# Patient Record
Sex: MALE | Race: WHITE | NOT HISPANIC OR LATINO | Employment: FULL TIME | URBAN - METROPOLITAN AREA
[De-identification: names, ages, dates, MRNs, and addresses within clinical notes are randomized per-mention and may not be internally consistent; named-entity substitution may affect disease eponyms.]

---

## 2017-06-08 ENCOUNTER — HOSPITAL ENCOUNTER (EMERGENCY)
Facility: HOSPITAL | Age: 62
Discharge: HOME | End: 2017-06-08
Attending: FAMILY MEDICINE
Payer: COMMERCIAL

## 2017-06-08 VITALS
HEIGHT: 70 IN | HEART RATE: 77 BPM | WEIGHT: 235 LBS | TEMPERATURE: 98 F | OXYGEN SATURATION: 99 % | BODY MASS INDEX: 33.64 KG/M2 | RESPIRATION RATE: 15 BRPM | SYSTOLIC BLOOD PRESSURE: 145 MMHG | DIASTOLIC BLOOD PRESSURE: 95 MMHG

## 2017-06-08 DIAGNOSIS — N28.89 RENAL MASS, LEFT: Primary | ICD-10-CM

## 2017-06-08 DIAGNOSIS — R31.9 HEMATURIA: ICD-10-CM

## 2017-06-08 LAB
ALBUMIN SERPL BCP-MCNC: 4 G/DL
ALP SERPL-CCNC: 59 U/L
ALT SERPL W/O P-5'-P-CCNC: 21 U/L
ANION GAP SERPL CALC-SCNC: 10 MMOL/L
AST SERPL-CCNC: 23 U/L
BACTERIA #/AREA URNS AUTO: ABNORMAL /HPF
BASOPHILS # BLD AUTO: 0.04 K/UL
BASOPHILS NFR BLD: 0.4 %
BILIRUB SERPL-MCNC: 0.8 MG/DL
BILIRUB UR QL STRIP: NEGATIVE
BUN SERPL-MCNC: 15 MG/DL
CALCIUM SERPL-MCNC: 9.9 MG/DL
CHLORIDE SERPL-SCNC: 101 MMOL/L
CLARITY UR REFRACT.AUTO: ABNORMAL
CO2 SERPL-SCNC: 27 MMOL/L
COLOR UR AUTO: YELLOW
CREAT SERPL-MCNC: 1.2 MG/DL
DIFFERENTIAL METHOD: NORMAL
EOSINOPHIL # BLD AUTO: 0.2 K/UL
EOSINOPHIL NFR BLD: 1.9 %
ERYTHROCYTE [DISTWIDTH] IN BLOOD BY AUTOMATED COUNT: 12.7 %
EST. GFR  (AFRICAN AMERICAN): >60 ML/MIN/1.73 M^2
EST. GFR  (NON AFRICAN AMERICAN): >60 ML/MIN/1.73 M^2
GLUCOSE SERPL-MCNC: 122 MG/DL
GLUCOSE UR QL STRIP: NEGATIVE
HCT VFR BLD AUTO: 45.2 %
HGB BLD-MCNC: 15.4 G/DL
HGB UR QL STRIP: ABNORMAL
HYALINE CASTS UR QL AUTO: 6 /LPF
KETONES UR QL STRIP: NEGATIVE
LEUKOCYTE ESTERASE UR QL STRIP: NEGATIVE
LYMPHOCYTES # BLD AUTO: 1.9 K/UL
LYMPHOCYTES NFR BLD: 18.9 %
MCH RBC QN AUTO: 29.7 PG
MCHC RBC AUTO-ENTMCNC: 34.1 %
MCV RBC AUTO: 87 FL
MICROSCOPIC COMMENT: ABNORMAL
MONOCYTES # BLD AUTO: 0.8 K/UL
MONOCYTES NFR BLD: 8 %
NEUTROPHILS # BLD AUTO: 7 K/UL
NEUTROPHILS NFR BLD: 70.5 %
NITRITE UR QL STRIP: NEGATIVE
PH UR STRIP: 5 [PH] (ref 5–8)
PLATELET # BLD AUTO: 338 K/UL
PMV BLD AUTO: 9.9 FL
POTASSIUM SERPL-SCNC: 3.3 MMOL/L
PROT SERPL-MCNC: 8.1 G/DL
PROT UR QL STRIP: ABNORMAL
RBC # BLD AUTO: 5.19 M/UL
RBC #/AREA URNS AUTO: >100 /HPF (ref 0–4)
SODIUM SERPL-SCNC: 138 MMOL/L
SP GR UR STRIP: 1.01 (ref 1–1.03)
URN SPEC COLLECT METH UR: ABNORMAL
UROBILINOGEN UR STRIP-ACNC: NEGATIVE EU/DL
WBC # BLD AUTO: 9.96 K/UL
WBC #/AREA URNS AUTO: 7 /HPF (ref 0–5)

## 2017-06-08 PROCEDURE — 81001 URINALYSIS AUTO W/SCOPE: CPT

## 2017-06-08 PROCEDURE — 85025 COMPLETE CBC W/AUTO DIFF WBC: CPT

## 2017-06-08 PROCEDURE — 99284 EMERGENCY DEPT VISIT MOD MDM: CPT

## 2017-06-08 PROCEDURE — 80053 COMPREHEN METABOLIC PANEL: CPT

## 2017-06-08 PROCEDURE — 99285 EMERGENCY DEPT VISIT HI MDM: CPT | Mod: ,,, | Performed by: PHYSICIAN ASSISTANT

## 2017-06-08 RX ORDER — DIAZEPAM 2 MG/1
1 TABLET ORAL EVERY 6 HOURS PRN
COMMUNITY
End: 2017-06-28

## 2017-06-08 RX ORDER — ZOLPIDEM TARTRATE 12.5 MG/1
12.5 TABLET, FILM COATED, EXTENDED RELEASE ORAL NIGHTLY PRN
COMMUNITY
End: 2017-06-09 | Stop reason: HOSPADM

## 2017-06-08 RX ORDER — PRAVASTATIN SODIUM 10 MG/1
10 TABLET ORAL DAILY
COMMUNITY
End: 2017-06-28 | Stop reason: DRUGHIGH

## 2017-06-08 RX ORDER — ASPIRIN 81 MG/1
81 TABLET ORAL DAILY
COMMUNITY

## 2017-06-08 RX ORDER — HYDROCHLOROTHIAZIDE 25 MG/1
25 TABLET ORAL EVERY MORNING
COMMUNITY

## 2017-06-08 NOTE — ED TRIAGE NOTES
Pt c/o hematuria onset last night - reports bright red blood. Pt c/o left flank tenderness. Denies pain at this time. Denies abdominal pain. Denies pain or burning with urination. Denies fever or chills.

## 2017-06-08 NOTE — ED PROVIDER NOTES
Encounter Date: 6/8/2017       History     Chief Complaint   Patient presents with    Hematuria     Review of patient's allergies indicates:   Allergen Reactions    Penicillins Hives     Patient is a 62-year-old male with past medical history of hypertension who presents to the emergency department due to a 1 day history of hematuria.  Patient states that he was at his hotel at around 10 PM last night when he noticed his urine was a pinkish color. Patient states he awoke this morning and was having clear urine.  Patient states this afternoon he had one other episode of dark bloody urination.  Patient states that he is not having any pain at this time.  Patient denies any nausea, vomiting, fevers, chills, chest pain, shortness of breath, or any other complaints.        Past Medical History:   Diagnosis Date    High cholesterol     Hypertension      Past Surgical History:   Procedure Laterality Date    ROTATOR CUFF REPAIR Right      History reviewed. No pertinent family history.  Social History   Substance Use Topics    Smoking status: Former Smoker     Quit date: 6/8/1997    Smokeless tobacco: Not on file    Alcohol use Yes      Comment: occassional     Review of Systems   Constitutional: Negative for activity change, appetite change, diaphoresis, fatigue and fever.   HENT: Negative for congestion, dental problem, drooling, ear pain, facial swelling, sore throat and trouble swallowing.    Eyes: Negative for pain, discharge and visual disturbance.   Respiratory: Negative for apnea, cough, chest tightness and shortness of breath.    Cardiovascular: Negative for chest pain and palpitations.   Gastrointestinal: Negative for abdominal distention, anal bleeding, blood in stool, diarrhea, nausea and vomiting.   Endocrine: Negative for cold intolerance and polydipsia.   Genitourinary: Positive for hematuria. Negative for decreased urine volume, difficulty urinating, dysuria, enuresis and frequency.   Musculoskeletal:  Negative for arthralgias, gait problem, myalgias and neck stiffness.   Skin: Negative for color change and pallor.   Allergic/Immunologic: Negative for environmental allergies.   Neurological: Negative for dizziness, syncope, numbness and headaches.   Psychiatric/Behavioral: Negative for agitation, confusion and dysphoric mood.       Physical Exam     Initial Vitals [06/08/17 1331]   BP Pulse Resp Temp SpO2   (!) 145/95 77 15 98.1 °F (36.7 °C) 99 %     Physical Exam    Nursing note and vitals reviewed.  Constitutional: He appears well-developed and well-nourished. He is not diaphoretic. No distress.   HENT:   Head: Normocephalic and atraumatic.   Neck: Normal range of motion. Neck supple.   Cardiovascular: Normal rate, regular rhythm and normal heart sounds. Exam reveals no gallop and no friction rub.    No murmur heard.  Pulmonary/Chest: Breath sounds normal. He has no wheezes. He has no rhonchi. He has no rales.   Abdominal: Soft. Bowel sounds are normal. There is no tenderness. There is no rebound and no guarding.   Musculoskeletal: Normal range of motion.   Neurological: He is alert and oriented to person, place, and time.   Skin: Skin is warm and dry. No rash noted. No erythema.   Psychiatric: He has a normal mood and affect.         ED Course   Procedures  Labs Reviewed   CBC W/ AUTO DIFFERENTIAL   COMPREHENSIVE METABOLIC PANEL   URINALYSIS             Medical Decision Making:   History:   Old Medical Records: I decided to obtain old medical records.       APC / Resident Notes:   Patient is a 62-year-old male with past medical history of hypertension who presents to the emergency department due to a 1 day history of hematuria.  Physical exam reveals male in no acute distress.  Heart regular rate and rhythm.  Lungs clear to auscultation bilaterally.  Abdomen soft nontender nondistended.  No CVA tenderness.  Differential diagnosis includes but is not excluded to kidney stones, prostatitis, kidney cancer, bladder  cancer.  Will obtain CBC, CMP, and CT.    CBC shows no leukocytosis with a white blood cell count of 9.96.  CMP shows BUN 15 and creatinine 1.2.  UA shows 3+ blood and 1+ protein.  CT shows large heterogeneous left renal mass roughly measuring 10 cm with a mixed hypo-and hyperdense attenuation which could possibly reflect intratumoral hemorrhage and/or central necrosis. The left renal vein is dilated and could potentially be involved. Findings are concerning for renal cell carcinoma given the patient's history.  Urology was consulted and would like to see the patient in clinic tomorrow for follow-up.  Results of CT scan were discussed with patient and family member.  Patient will be discharged in stable condition.  Patient is to follow up with urology in the morning.  Plan of treatment discussed with attending physician and he is agreeable.                ED Course     Clinical Impression:   The encounter diagnosis was Hematuria.    Disposition:   Disposition: Discharged  Condition: Marilou Akins PA-C  06/08/17 2034       Halima Akins PA-C  06/10/17 7671

## 2017-06-09 ENCOUNTER — OFFICE VISIT (OUTPATIENT)
Dept: UROLOGY | Facility: CLINIC | Age: 62
End: 2017-06-09
Payer: COMMERCIAL

## 2017-06-09 ENCOUNTER — HOSPITAL ENCOUNTER (OUTPATIENT)
Dept: RADIOLOGY | Facility: HOSPITAL | Age: 62
Discharge: HOME | End: 2017-06-09
Attending: UROLOGY
Payer: COMMERCIAL

## 2017-06-09 ENCOUNTER — TELEPHONE (OUTPATIENT)
Dept: UROLOGY | Facility: CLINIC | Age: 62
End: 2017-06-09

## 2017-06-09 VITALS
HEIGHT: 70 IN | HEART RATE: 115 BPM | WEIGHT: 235 LBS | SYSTOLIC BLOOD PRESSURE: 158 MMHG | BODY MASS INDEX: 33.64 KG/M2 | DIASTOLIC BLOOD PRESSURE: 90 MMHG

## 2017-06-09 DIAGNOSIS — N28.89 RENAL MASS: Primary | ICD-10-CM

## 2017-06-09 DIAGNOSIS — N28.89 RENAL MASS: ICD-10-CM

## 2017-06-09 PROCEDURE — 74178 CT ABD&PLV WO CNTR FLWD CNTR: CPT | Mod: 26,,, | Performed by: RADIOLOGY

## 2017-06-09 PROCEDURE — 99205 OFFICE O/P NEW HI 60 MIN: CPT | Mod: S$GLB,,, | Performed by: UROLOGY

## 2017-06-09 PROCEDURE — 25500020 PHARM REV CODE 255: Performed by: UROLOGY

## 2017-06-09 PROCEDURE — 71250 CT THORAX DX C-: CPT | Mod: 26,,, | Performed by: RADIOLOGY

## 2017-06-09 PROCEDURE — 71250 CT THORAX DX C-: CPT | Mod: TC

## 2017-06-09 PROCEDURE — 99999 PR PBB SHADOW E&M-EST. PATIENT-LVL IV: CPT | Mod: PBBFAC,,, | Performed by: UROLOGY

## 2017-06-09 PROCEDURE — 88112 CYTOPATH CELL ENHANCE TECH: CPT | Performed by: PATHOLOGY

## 2017-06-09 PROCEDURE — 74178 CT ABD&PLV WO CNTR FLWD CNTR: CPT | Mod: TC

## 2017-06-09 RX ORDER — ZOLPIDEM TARTRATE 12.5 MG/1
TABLET, FILM COATED, EXTENDED RELEASE ORAL
COMMUNITY
Start: 2017-02-24 | End: 2017-11-29

## 2017-06-09 RX ORDER — ATORVASTATIN CALCIUM 40 MG/1
40 TABLET, FILM COATED ORAL NIGHTLY
COMMUNITY
Start: 2016-12-28 | End: 2017-06-28 | Stop reason: ALTCHOICE

## 2017-06-09 RX ORDER — LORAZEPAM 1 MG/1
TABLET ORAL
COMMUNITY
Start: 2016-11-09

## 2017-06-09 RX ORDER — HYDROCHLOROTHIAZIDE 25 MG/1
TABLET ORAL
COMMUNITY
Start: 2017-04-20 | End: 2017-06-28 | Stop reason: DRUGHIGH

## 2017-06-09 RX ORDER — PRAVASTATIN SODIUM 40 MG/1
40 TABLET ORAL NIGHTLY
COMMUNITY
Start: 2017-04-20

## 2017-06-09 RX ADMIN — IOHEXOL 125 ML: 350 INJECTION, SOLUTION INTRAVENOUS at 11:06

## 2017-06-09 NOTE — PROGRESS NOTES
CHIEF COMPLAINT:    Mr. Mcintosh is a 62 y.o. male presenting for a consultation at the request of Dr. Louis. Patient presents with gross hematuria.    PRESENTING ILLNESS:    Cristhian Mcintosh is a 62 y.o. male with HTN, HLD who presented to the ED 6/8/2017 with gross hematuria x 24 hours.  No prior history of hematuria.  No history of trauma.  CT RSS was performed, which demonstrated a 9.8 cm left lower pole renal mass.  The patient is a former smoker, 15 pack years.  Denies fever, chills, N/V, dysuria, flank pain.      Patient complains of hesitancy, slightly decreased FOS, nocturia x 2.  Denies intermittency, frequency, urgency. He is not bothered by his LUTS.  Patient denies ED.     REVIEW OF SYSTEMS:  All other systems reviewed and negative except pertinent positives noted in HPI.      PATIENT HISTORY:    Past Medical History:   Diagnosis Date    High cholesterol     Hypertension        Past Surgical History:   Procedure Laterality Date    ROTATOR CUFF REPAIR Right        Family History   Problem Relation Age of Onset    Prostate cancer Neg Hx     Kidney disease Neg Hx        Social History     Social History    Marital status:      Spouse name: N/A    Number of children: N/A    Years of education: N/A     Occupational History    Not on file.     Social History Main Topics    Smoking status: Former Smoker     Quit date: 6/8/1997    Smokeless tobacco: Never Used    Alcohol use Yes      Comment: occassional    Drug use: No    Sexual activity: Not Currently     Other Topics Concern    Not on file     Social History Narrative    No narrative on file       Allergies:  Penicillins    Medications:    Current Outpatient Prescriptions:     aspirin (ECOTRIN) 81 MG EC tablet, Take 81 mg by mouth once daily., Disp: , Rfl:     atorvastatin (LIPITOR) 40 MG tablet, Take by mouth., Disp: , Rfl:     diazePAM (VALIUM) 2 MG tablet, Take 1 mg by mouth every 6 (six) hours as needed for Anxiety., Disp: ,  Rfl:     hydrochlorothiazide (HYDRODIURIL) 25 MG tablet, Take 25 mg by mouth once daily., Disp: , Rfl:     hydrochlorothiazide (HYDRODIURIL) 25 MG tablet, TAKE 1/2 TABLET BY MOUTH DAILY, Disp: , Rfl:     lorazepam (ATIVAN) 1 MG tablet, TAKE ONE TABLET BY MOUTH THREE TIMES DAILY AS NEEDED FOR ANXIETY., Disp: , Rfl:     pravastatin (PRAVACHOL) 10 MG tablet, Take 10 mg by mouth once daily., Disp: , Rfl:     pravastatin (PRAVACHOL) 40 MG tablet, , Disp: , Rfl:     zolpidem (AMBIEN CR) 12.5 MG CR tablet, TAKE 1 TABLET BY MOUTH EVERY DAY AT BEDTIME AS NEEDED FOR INSOMNIA, Disp: , Rfl:     PHYSICAL EXAMINATION:    The patient generally appears in good health, is appropriately interactive, and is in no apparent distress.     Eyes: anicteric sclerae, moist conjunctivae; no lid-lag    HENT: Normocephalic, atraumatic    Musculoskeletal: No abnormal gait.    Mental: Cooperative with normal affect.  Is oriented to time, place, and person.    Neuro: Grossly intact.    Chest: Normal inspiratory effort.   No accessory muscles.  No audible wheezes.  Respirations symmetric on inspiration and expiration.    Heart: Regular rhythm.      Abdomen:  Soft, non-tender. No masses or organomegaly. Bladder is not palpable. No evidence of flank discomfort. No palpable abdominal or flank mass.     Genitourinary: The penis is circumcised with no evidence of plaques or induration. The urethral meatus is normal. The testes, epididymides, and cord structures are normal in size and contour bilaterally. The scrotum is normal in size and contour.    Normal anal sphincter tone. No rectal mass.    The prostate is 20 g. Normal landmarks. Lateral sulci. Median furrow intact.  No nodularity or induration. Seminal vesicles are normal.    Extremities: No clubbing, cyanosis, or edema      LABS:    Lab Results   Component Value Date    WBC 9.96 06/08/2017    HGB 15.4 06/08/2017    HCT 45.2 06/08/2017    MCV 87 06/08/2017     06/08/2017     BMP  Lab  Results   Component Value Date     06/08/2017    K 3.3 (L) 06/08/2017     06/08/2017    CO2 27 06/08/2017    BUN 15 06/08/2017    CREATININE 1.2 06/08/2017    CALCIUM 9.9 06/08/2017    ANIONGAP 10 06/08/2017    ESTGFRAFRICA >60.0 06/08/2017    EGFRNONAA >60.0 06/08/2017     Lab Results   Component Value Date    ALT 21 06/08/2017    AST 23 06/08/2017    ALKPHOS 59 06/08/2017    BILITOT 0.8 06/08/2017       No results found for: PSA, PSADIAG, PSATOTAL, PSAFREE, PSAFREEPCT    IMPRESSION:    Encounter Diagnoses   Name Primary?    Renal mass Yes     62 YOM with gross hematuria and 9.8 cm left lower pole renal mass, concerning for [id number] renal cell carcinoma    PLAN:  - CT urogram, CT chest today--reviewed and reveal heterogenous renal mass, no pulmonary nodules or lesions.  No evidence of metastatic disease.   - urine cytology, PSA today   - will schedule flexible cystoscopy   - LFTs WNL   - Discussed with patient that this renal mass likely represents a renal cell carcinoma and will likely require total nephrectomy; this can be done here or closer to patient's home in [place], [place].  Patient after thinking about this would like to have his care here in [place].  Will plan for a left robotic nephrectomy on 6/30.  Patient will come back for a cystoscopy in the office on 6/29/17 in [place] and pre-op/H&P visit on the same day.  - will observe LUTS as they do not bother him  - follow up for cystoscopy and surgery in ~3 weeks.   I spent 60 minutes with the patient of which more than half was spent in direct consultation with the patient in regards to our treatment and plan.

## 2017-06-09 NOTE — TELEPHONE ENCOUNTER
----- Message from Yeni Cervantes MA sent at 6/9/2017  8:13 AM CDT -----  Contact: [phone]  E.r. F/u 10 cm renal mass suspicious for renal cell carcinoma , trying to be seen today.    [facility]

## 2017-06-12 ENCOUNTER — TELEPHONE (OUTPATIENT)
Dept: UROLOGY | Facility: CLINIC | Age: 62
End: 2017-06-12

## 2017-06-12 DIAGNOSIS — R31.9 HEMATURIA: Primary | ICD-10-CM

## 2017-06-12 NOTE — TELEPHONE ENCOUNTER
----- Message from Jhoana Byrd sent at 6/12/2017 10:56 AM CDT -----  Contact: Luz HURT Calling from [facility] [phone] Ref [phone]   Calling to talk to nurse concerning patient prior authorization. Please advice

## 2017-06-12 NOTE — TELEPHONE ENCOUNTER
----- Message from Tulio Garcia MD sent at 6/12/2017 12:47 PM CDT -----  Please call patient and notify that his urine cytology was normal.

## 2017-06-13 ENCOUNTER — TELEPHONE (OUTPATIENT)
Dept: UROLOGY | Facility: CLINIC | Age: 62
End: 2017-06-13

## 2017-06-13 DIAGNOSIS — N28.89 RENAL MASS: Primary | ICD-10-CM

## 2017-06-14 ENCOUNTER — TELEPHONE (OUTPATIENT)
Dept: UROLOGY | Facility: CLINIC | Age: 62
End: 2017-06-14

## 2017-06-14 NOTE — TELEPHONE ENCOUNTER
----- Message from Awilda Pedroza sent at 6/14/2017  8:15 AM CDT -----  Contact: wife/[phone]  If there is cancellation they would like to move the surgery up.  , done

## 2017-06-20 ENCOUNTER — ANESTHESIA EVENT (OUTPATIENT)
Dept: SURGERY | Facility: HOSPITAL | Age: 62
DRG: 658 | End: 2017-06-20
Payer: COMMERCIAL

## 2017-06-20 DIAGNOSIS — Z01.818 PREOP TESTING: Primary | ICD-10-CM

## 2017-06-20 NOTE — PRE ADMISSION SCREENING
"Anesthesia Assessment: Preoperative EQUATION    Planned Procedure: Procedure(s) (LRB):  ROBOTIC ASSISTED LAPAROSCOPIC NEPHRECTOMY (Left)  Requested Anesthesia Type:General  Surgeon: Tulio Garcia MD  Service: Urology  Known or anticipated Date of Surgery:6/30/2017    Surgeon notes: reviewed and Renal mass    Electronic QUestionnaire Assessment completed via nurse interview with patient.        Cristhian Mcintosh [[id number]] - 62 y.o. Male     Providers Outside of [facility]     Flowsheet Row Pre-admit from 6/30/2017 in [facility]   Has outside PCP Yes [[facility] PCP-Dr. Guerrero-last visit x3-4mo. ago-[facility]]   Surgical Risk Level     Surgical Risk Level:  3       caRDScore (Clinical Anesthesia Rapid Decision Score)     Low   Total Score: 15    15 Sum of Clinical Scores   caRDScores (Grouped)     caRDScore - Ane:  7            caRDScore - CVD:  1            caRDScore - Pul:  2            caRDScore - Met:  1            caRDScore - Phy:  4       caRDScore Items     Flowsheet Row Pre-admit from 6/30/2017 in [facility]   Anesthesia   Oral or IM for chronic condition (steroid dependent- Why in comment) Yes [x3-4 months ago-oral & x1 IM injection for URI]   Has large neck size >40cm (15.7in., large male shirt size, large male collar size >16) Yes [17.5]   Has sleep apnea confirmed by a sleep study / on CPAP Yes   Has panic attacks Yes   Has chronic anxiety Yes   CVD   Activity similar to best ability for maximal activity or exercise METS 4   Diagnosed with high blood pressure Yes   <150/90">Typical BP runs <150/90 Yes   Pulmonary   Total smoking adds up to 10 - 20 years Yes [20]   Has sleep apnea confirmed by a sleep study / on CPAP Yes   Metabolic   Has kidney problem, NOT followed by nephrologist -- [renal mass]   Has hyperlipoproteinemia Yes   Physiologic   Having abnormal bleeding (intestinal, urinary, vaginal, coughing up blood, etc.) Yes [urinary]   Has dementia, cognitive " impairment, memory disorder Yes [mild STM]   Flags     Red Flag Score:  0            Yellow Flag Score:  6       Red Flags     No data to display   Yellow Flags     Flowsheet Row Pre-admit from 6/30/2017 in [facility]   Has had steroids in the last year Yes   Is or has been a Smoker Yes   Having abnormal bleeding (intestinal, urinary, vaginal, coughing up blood, etc.) Yes [urinary]   Aspirin Yes   Has sleep apnea confirmed by a sleep study / on CPAP Yes   Has kidney problem, NOT followed by nephrologist -- [renal mass]   Has dementia, cognitive impairment, memory disorder Yes [mild STM]   PONV Risk Score (assumes periop narcotic use = +1, Max=4)     PONV Risk Score:  2       PONV Risk Factors   Total Score: 1        Sleep Apnea   Total Score: 1    1 Has sleep apnea confirmed by a sleep study / on CPAP   MATY STOP-Bang Risk Factors (Max=8)   Total Score: 5    1 Has loud snoring   1 Takes medication for high blood pressure   1 Age >50   1 Has large neck size >40cm   1 Male   MATY Risk Level - 1 (Low), 2 (Moderate), 3 (High)     MATY Risk Level:  3       RCRI (Revised Cardiac Risk Indices of ACC/AHA guidelines, Max=6)   Total Score: 1    1 Patient is having an intra-abdominal thoracic or major vascular case   CAD Risk Factors   Total Score: 5                        CHADS Score if applicable (history of atrial fib/flutter, Max=6)   Total Score: 1        Maximal Exercise Capacity     Flowsheet Row Pre-admit from 6/30/2017 in [facility]   Maximal Exercise Capacity METS 4   Summary of Dependence   Total Score: 1    1 Is totally independent of others for activities of daily living   Phone Fraility Score (Max = 17)   Total Score: 2    1 Uses 5 or more meds on reg basis   1 Has dementia, cognitive impairment, memory disorder   Pain Factors     No data to display   Risk Triggers (Evidence-Based Risk Triggers)     Pulmonary Risk Triggers   Total Score: 3                Renal Risk Triggers    Total Score: 1        Delirium Risk Triggers   Total Score: 1        Urologic Risk Triggers   Total Score: 0    Logistics     Pre-op Clinic Logistics   Total Score: 6                        [facility] Logistics   Total Score: 3            Discharge Logistics   Total Score: 4            Discharge Planning     Flowsheet Row Pre-admit from 6/30/2017 in [facility]   Discharge Planning   Will assist patient 24/7, if needed -- [[phone]]   Anes & Int Med <For office use only>     Total Score: 13      Surgical Risk Level Assessment             Triage considerations:     The patient has no apparent active cardiac condition (No unstable coronary Syndrome such as severe unstable angina or recent [<1 month] myocardial infarction, decompensated CHF, severe valvular   disease or significant arrhythmia)    Previous anesthesia records:No problems and Not available-Patient stated he has always done well with anesthesia. Past surgeries: Corrective Vision surgery/ Vasectomy/ & Rotator Cuff Repair-right. No anesthesia records found in [facility].    Last PCP note: 3-6 months ago , outside [facility] , focused visit       Other important co-morbidities:   Diagnosis Date    High cholesterol      Hypertension    * Anxiety       Tests already available:  Results have been reviewed.Labs-6/9/17-CMP/CBC/UA            Instructions given. (See in Nurse's note)    Optimization:  Anesthesia Preop Clinic Assessment  Indicated    Medical Opinion Indicated             Plan:    Testing:  T&S and EKG   Pre-anesthesia  visit       Visit focus: concerns in complex and/or prolonged anesthesia, acute or chronic anxiety concerns     Consultation:IM Perioperative Hospitalist     Patient  has previously scheduled Medical Appointment:Appointment on 6/29/17 prior to surgery    Navigation: Tests Scheduled. Lab-T&S @ 8:10a & EKG @ 7:45a on 6/28/17             Consults scheduled.POC & Perioperative IM Consult on 6/28/17 @ 9a &  10a             Results will be tracked by Preop Clinic.                 Sahra Mancini, RN  6/20/17

## 2017-06-20 NOTE — ANESTHESIA PREPROCEDURE EVALUATION
" Anesthesia Assessment: Preoperative EQUATION    Planned Procedure: Procedure(s) (LRB):  ROBOTIC ASSISTED LAPAROSCOPIC NEPHRECTOMY (Left)  Requested Anesthesia Type:General  Surgeon: Tulio Garcia MD  Service: Urology  Known or anticipated Date of Surgery:6/30/2017    Surgeon notes: reviewed and Renal mass    Electronic QUestionnaire Assessment completed via nurse interview with patient.        Cristhian Mcintosh [[id number]] - 62 y.o. Male     Providers Outside of [facility]     Flowsheet Row Pre-admit from 6/30/2017 in [facility]   Has outside PCP Yes [[facility] PCP-Dr. Guerrero-last visit x3-4mo. ago-[facility]]   Surgical Risk Level     Surgical Risk Level:  3       caRDScore (Clinical Anesthesia Rapid Decision Score)     Low   Total Score: 15    15 Sum of Clinical Scores   caRDScores (Grouped)     caRDScore - Ane:  7            caRDScore - CVD:  1            caRDScore - Pul:  2            caRDScore - Met:  1            caRDScore - Phy:  4       caRDScore Items     Flowsheet Row Pre-admit from 6/30/2017 in [facility]   Anesthesia   Oral or IM for chronic condition (steroid dependent- Why in comment) Yes [x3-4 months ago-oral & x1 IM injection for URI]   Has large neck size >40cm (15.7in., large male shirt size, large male collar size >16) Yes [17.5]   Has sleep apnea confirmed by a sleep study / on CPAP Yes   Has panic attacks Yes   Has chronic anxiety Yes   CVD   Activity similar to best ability for maximal activity or exercise METS 4   Diagnosed with high blood pressure Yes   <150/90">Typical BP runs <150/90 Yes   Pulmonary   Total smoking adds up to 10 - 20 years Yes [20]   Has sleep apnea confirmed by a sleep study / on CPAP Yes   Metabolic   Has kidney problem, NOT followed by nephrologist -- [renal mass]   Has hyperlipoproteinemia Yes   Physiologic   Having abnormal bleeding (intestinal, urinary, vaginal, coughing up blood, etc.) Yes [urinary]   Has dementia, cognitive " impairment, memory disorder Yes [mild STM]   Flags     Red Flag Score:  0            Yellow Flag Score:  6       Red Flags     No data to display   Yellow Flags     Flowsheet Row Pre-admit from 6/30/2017 in [facility]   Has had steroids in the last year Yes   Is or has been a Smoker Yes   Having abnormal bleeding (intestinal, urinary, vaginal, coughing up blood, etc.) Yes [urinary]   Aspirin Yes   Has sleep apnea confirmed by a sleep study / on CPAP Yes   Has kidney problem, NOT followed by nephrologist -- [renal mass]   Has dementia, cognitive impairment, memory disorder Yes [mild STM]   PONV Risk Score (assumes periop narcotic use = +1, Max=4)     PONV Risk Score:  2       PONV Risk Factors   Total Score: 1        Sleep Apnea   Total Score: 1    1 Has sleep apnea confirmed by a sleep study / on CPAP   MATY STOP-Bang Risk Factors (Max=8)   Total Score: 5    1 Has loud snoring   1 Takes medication for high blood pressure   1 Age >50   1 Has large neck size >40cm   1 Male   MATY Risk Level - 1 (Low), 2 (Moderate), 3 (High)     MATY Risk Level:  3       RCRI (Revised Cardiac Risk Indices of ACC/AHA guidelines, Max=6)   Total Score: 1    1 Patient is having an intra-abdominal thoracic or major vascular case   CAD Risk Factors   Total Score: 5                        CHADS Score if applicable (history of atrial fib/flutter, Max=6)   Total Score: 1        Maximal Exercise Capacity     Flowsheet Row Pre-admit from 6/30/2017 in [facility]   Maximal Exercise Capacity METS 4   Summary of Dependence   Total Score: 1    1 Is totally independent of others for activities of daily living   Phone Fraility Score (Max = 17)   Total Score: 2    1 Uses 5 or more meds on reg basis   1 Has dementia, cognitive impairment, memory disorder   Pain Factors     No data to display   Risk Triggers (Evidence-Based Risk Triggers)     Pulmonary Risk Triggers   Total Score: 3                Renal Risk Triggers    Total Score: 1        Delirium Risk Triggers   Total Score: 1        Urologic Risk Triggers   Total Score: 0    Logistics     Pre-op Clinic Logistics   Total Score: 6                        DOSC Logistics   Total Score: 3            Discharge Logistics   Total Score: 4            Discharge Planning     Flowsheet Row Pre-admit from 6/30/2017 in [facility]   Discharge Planning   Will assist patient 24/7, if needed -- [[id number]]   Anes & Int Med <For office use only>     Total Score: 13      Surgical Risk Level Assessment             Triage considerations:     The patient has no apparent active cardiac condition (No unstable coronary Syndrome such as severe unstable angina or recent [<1 month] myocardial infarction, decompensated CHF, severe valvular   disease or significant arrhythmia)    Previous anesthesia records:No problems and Not available-Patient stated he has always done well with anesthesia. Past surgeries: Corrective Vision surgery/ Vasectomy/ & Rotator Cuff Repair-right. No anesthesia records found in [facility].    Last PCP note: 3-6 months ago , outside [facility] , focused visit       Other important co-morbidities:   Diagnosis Date    High cholesterol      Hypertension    * Anxiety       Tests already available:  Results have been reviewed.Labs-6/9/17-CMP/CBC/UA            Instructions given. (See in Nurse's note)    Optimization:  Anesthesia Preop Clinic Assessment  Indicated    Medical Opinion Indicated             Plan:    Testing:  T&S and EKG   Pre-anesthesia  visit       Visit focus: concerns in complex and/or prolonged anesthesia, acute or chronic anxiety concerns     Consultation:IM Perioperative Hospitalist     Patient  has previously scheduled Medical Appointment:Appointment on 6/29/17 prior to surgery    Navigation: Tests Scheduled. Lab-T&S @ 8:10a & EKG @ 7:45a on 6/28/17             Consults scheduled.POC & Perioperative IM Consult on 6/28/17 @ 9a &  10a             Results will be tracked by Preop Clinic.                 Sahra Mancini RN  6/20/17 06/20/2017  Cristhian cMintosh is a 62 y.o., male.    Pre-operative evaluation for Procedure(s) (LRB):  ROBOTIC ASSISTED LAPAROSCOPIC NEPHRECTOMY (Left)    Cristhian Mcintosh is a 62 y.o. male with PMH of HTN, HLD, claustrophobia, GERD, sleep apnea (CPAP stopped with weight loss).  He presented to the ED on 6/8/2017 with gross hematuria.  CT showed 9.8cm left lower pole renal mass.  No evidence of metastatic disease.    PSH:   Shoulder Rotator Cuff Surgery 11/14/2014   Vasectomy 1987    Hx vasectomy    Pr colonoscopy,diagnostic 9/13/2012   COLONOSCOPY performed by Sebastián Leon DO at [facility]    Erupted tooth extraction     Prev airway:   No prior records  Previous Shoulder [facility] at [facility] on 11/14/2014, no intraop records in Care Everywhere.    Patient Active Problem List   Diagnosis    Hypertension    High cholesterol    Sleep apnea    Non morbid obesity    Hyperglycemia    Lower urinary tract symptoms (LUTS)       Review of patient's allergies indicates:   Allergen Reactions    Penicillins Hives and Rash       Past Surgical History:   Procedure Laterality Date    Corrective Vision surgery      ROTATOR CUFF REPAIR Right     VASECTOMY         Social History     Social History    Marital status:      Spouse name: N/A    Number of children: N/A    Years of education: N/A     Occupational History    Not on file.     Social History Main Topics    Smoking status: Former Smoker     Packs/day: 1.00     Years: 20.00     Quit date: 6/8/1997    Smokeless tobacco: Never Used    Alcohol use Yes      Comment: occassional- 2 tammy/ occasional beer -2 times a week    Drug use: No    Sexual activity: Not Currently     Other Topics Concern    Not on file      Social History Narrative    No narrative on file       Medications:  No current facility-administered medications on file prior to encounter.      Current Outpatient Prescriptions on File Prior to Encounter   Medication Sig Dispense Refill    aspirin (ECOTRIN) 81 MG EC tablet Take 81 mg by mouth once daily.       hydrochlorothiazide (HYDRODIURIL) 25 MG tablet Take 25 mg by mouth every morning.       lorazepam (ATIVAN) 1 MG tablet TAKE ONE TABLET BY MOUTH THREE TIMES DAILY AS NEEDED FOR ANXIETY.      pravastatin (PRAVACHOL) 40 MG tablet Take 40 mg by mouth every evening.       zolpidem (AMBIEN CR) 12.5 MG CR tablet TAKE 1 TABLET BY MOUTH EVERY DAY AT BEDTIME AS NEEDED FOR INSOMNIA         Vital Signs Range (Last 24H):         Labs:  Results for MARK ANTHONY JHA (MRN [id number]) as of 2017 14:22   Ref. Range 2017 08:24 2017 10:40   Hemoglobin A1C Latest Ref Range: 4.0 - 5.6 %  5.4   Estimated Avg Glucose Latest Ref Range: 68 - 131 mg/dL  108   Group & Rh Unknown A POS    INDIRECT JUN Unknown NEG      CBC:   No results for input(s): WBC, RBC, HGB, HCT, PLT, MCV, MCH, MCHC in the last 72 hours.    CMP:  Recent Labs      17   1040   NA  140   K  3.5   CL  102   CO2  28   BUN  11   CREATININE  1.1   GLU  160*   CALCIUM  9.6       Coagulation:  No results for input(s): INR, PROTIME, APTT in the last 72 hours.    Diagnostic Studies:  CT Urogram 2017  Large heterogenous left renal mass concerning for renal cell carcinoma.  No convincing evidence to suggest metastatic disease.    EK2017  NSR 66bpm.    2D Echo:  2011 Exercise Stress Echo  - Left ventricle: The cavity size was normal. Wall thickness was increased    in a pattern of mild LVH. Systolic function was at the lower limits of    normal. The estimated ejection fraction was in the range of 50% to 55%.    No diagnostic regional wall motion abnormality identified. Mild diastolic    dysfunction (grade I, impaired relaxation  pattern), suggestive of normal    LV filling pressures.  - Right ventricle: The cavity size was at the upper limits of normal.    Systolic function was normal.  - Mitral valve: Trivial regurgitation.  - Tricuspid valve: Trivial regurgitation.  - Pulmonic valve: Trivial regurgitation.  - Stress: Maximal heart rate during stress was 162bpm (98% of maximal    predicted heart rate). The maximal predicted heart rate was 166bpm. The    target heart rate was achieved. The heart rate response to stress was    normal. There was a normal resting blood pressure with a hypertensive    response to stress. The rate-pressure product for the peak heart rate and    blood pressure was 98388sf Hg/min. The patient experienced no chest pain    during stress. Functional capacity was normal.  - Stress ECG conclusions: There were no stress arrhythmias or conduction    abnormalities. The stress ECG was negative for ischemia.  - Stress echo: There was no echocardiographic evidence for stress-induced    ischemia.  Impressions: Normal study after maximal exercise.      Anesthesia Evaluation    I have reviewed the Patient Summary Reports.    I have reviewed the Nursing Notes.   I have reviewed the Medications.     Review of Systems  Anesthesia Hx:  History of prior surgery of interest to airway management or planning: Denies Family Hx of Anesthesia complications.   Denies Personal Hx of Anesthesia complications.   Social:  Former Smoker, Social Alcohol Use    Hematology/Oncology:         -- Denies Anemia: Oncology Comments: Possible renal cell carcinoma    EENT/Dental:EENT/Dental Normal   Cardiovascular:   Exercise tolerance: good Hypertension Denies MI.       hyperlipidemia    Pulmonary:   Denies COPD.  Denies Asthma. Sleep Apnea    Renal/:   Chronic Renal Disease hematuria   Hepatic/GI:   GERD    Musculoskeletal:  Musculoskeletal Normal    Neurological:  Neurology Normal    Endocrine:   Denies Diabetes. Denies Hypothyroidism.     Dermatological:  Skin Normal    Psych:  Psychiatric Normal           Physical Exam  General:  Well nourished, Obesity    Airway/Jaw/Neck:  Airway Findings: Mouth Opening: Normal Tongue: Normal  General Airway Assessment: Adult  Mallampati: II  Improves to I with phonation.  TM Distance: Normal, at least 6 cm  Jaw/Neck Findings:  Neck ROM: Normal ROM  Neck Findings:     Eyes/Ears/Nose:  EYES/EARS/NOSE FINDINGS: Normal   Dental:  Dental Findings: In tact   Chest/Lungs:  Chest/Lungs Findings: Clear to auscultation, Normal Respiratory Rate     Heart/Vascular:  Heart Findings: Rate: Normal  Rhythm: Regular Rhythm        Mental Status:  Mental Status Findings: Normal        Anesthesia Plan  Type of Anesthesia, risks & benefits discussed:  Anesthesia Type:  general  Patient's Preference:   Intra-op Monitoring Plan: standard ASA monitors and arterial line  Intra-op Monitoring Plan Comments:   Post Op Pain Control Plan: multimodal analgesia, per primary service following discharge from PACU and IV/PO Opioids PRN  Post Op Pain Control Plan Comments:   Induction:   IV  Beta Blocker:  Patient is not currently on a Beta-Blocker (No further documentation required).       Informed Consent: Patient understands risks and agrees with Anesthesia plan.  Questions answered. Anesthesia consent signed with patient.  ASA Score: 3     Day of Surgery Review of History & Physical:    H&P update referred to the surgeon.     Anesthesia Plan Notes: Plan for 2 IVs and arterial line.         Ready For Surgery From Anesthesia Perspective.

## 2017-06-28 ENCOUNTER — HOSPITAL ENCOUNTER (OUTPATIENT)
Dept: PREADMISSION TESTING | Facility: HOSPITAL | Age: 62
Discharge: HOME | DRG: 658 | End: 2017-06-28
Attending: ANESTHESIOLOGY
Payer: COMMERCIAL

## 2017-06-28 ENCOUNTER — HOSPITAL ENCOUNTER (OUTPATIENT)
Dept: CARDIOLOGY | Facility: CLINIC | Age: 62
Discharge: HOME | DRG: 658 | End: 2017-06-28
Payer: COMMERCIAL

## 2017-06-28 ENCOUNTER — INITIAL CONSULT (OUTPATIENT)
Dept: INTERNAL MEDICINE | Facility: CLINIC | Age: 62
DRG: 658 | End: 2017-06-28
Payer: COMMERCIAL

## 2017-06-28 VITALS
HEIGHT: 70 IN | SYSTOLIC BLOOD PRESSURE: 125 MMHG | BODY MASS INDEX: 33.64 KG/M2 | HEART RATE: 66 BPM | DIASTOLIC BLOOD PRESSURE: 84 MMHG | WEIGHT: 235 LBS | TEMPERATURE: 98 F | OXYGEN SATURATION: 96 %

## 2017-06-28 DIAGNOSIS — I10 ESSENTIAL HYPERTENSION: ICD-10-CM

## 2017-06-28 DIAGNOSIS — E87.6 HYPOKALEMIA: ICD-10-CM

## 2017-06-28 DIAGNOSIS — R73.9 HYPERGLYCEMIA: ICD-10-CM

## 2017-06-28 DIAGNOSIS — E78.00 HIGH CHOLESTEROL: ICD-10-CM

## 2017-06-28 DIAGNOSIS — G47.30 SLEEP APNEA, UNSPECIFIED TYPE: ICD-10-CM

## 2017-06-28 DIAGNOSIS — R39.9 LOWER URINARY TRACT SYMPTOMS (LUTS): ICD-10-CM

## 2017-06-28 DIAGNOSIS — E66.9 NON MORBID OBESITY, UNSPECIFIED OBESITY TYPE: ICD-10-CM

## 2017-06-28 DIAGNOSIS — Z01.818 PREOP TESTING: ICD-10-CM

## 2017-06-28 PROCEDURE — 99999 PR PBB SHADOW E&M-EST. PATIENT-LVL III: CPT | Mod: PBBFAC,,, | Performed by: HOSPITALIST

## 2017-06-28 PROCEDURE — 99244 OFF/OP CNSLTJ NEW/EST MOD 40: CPT | Mod: S$GLB,,, | Performed by: HOSPITALIST

## 2017-06-28 PROCEDURE — 93000 ELECTROCARDIOGRAM COMPLETE: CPT | Mod: S$GLB,,, | Performed by: INTERNAL MEDICINE

## 2017-06-28 NOTE — PROGRESS NOTES
Chief complaint-Preoperative evaluation , Perioperative Medical management, complication reduction plan     Date of Evaluation- 06/28/2017    PCP-  Primary Doctor No    Future cases for Cristhian Mcintosh [[id number]]     Case ID Status Date Time Lawson Procedure Provider Location    [id number] [facility] 6/30/2017 10:30  ROBOTIC ASSISTED LAPAROSCOPIC NEPHRECTOMY Tulio Garcia MD [[id number]] [facility] OR 2ND FLR        Left  History of present illness- I had the pleasure of meeting this pleasant 62 y.o. gentleman in the pre op clinic prior to his elective Urological surgery. The patient is new to me . Cristhian was accompanied by wife Mallory.    I have obtained the history by speaking to the patient and by reviewing the electronic health records.    Events leading up to surgery / History of presenting illness -    Presented to the ED 6/8/2017 with gross hematuria x 24 hours.  No prior history of hematuria.  CT RSS was performed, which demonstrated a 9.8 cm left lower pole renal mass.      No pain from this .No aggravating factors. No relieving factors     Relevant health conditions of significance for the perioperative period/ History of presenting illness -    He describes his health as fairly good    Obesity  Lost 30 pounds of weight in the last year    Hypertension ,On medication  Home  machine readings -  125-145 / 90-95    Sleep apnea .Not using  CPAP .Suggested bringing for hospital use . Informed the risk of worsening deep apnea in the perioperative period and suggest using CPAP use any time in 24 hrs ( day or night )for planned sleep  Avoidance of  supine sleep, weight gain and alcoholic beverages discussed since all of these can worsen MATY     Had chest pain in the past on more than one occasions  Also had back pain and was felt to be muscular March    Had EKG that showed some abnormality which is not new  Precordial pain- no radiation, at rest, soreness, tender to touch, does farm work, golfing  No associated sweating ,  nausea, vomiting, diarrhea    Acid reflux-better since loosing weight     Not known to have Diabetes Mellitus, fatty liver     Active cardiac conditions- none    Revised cardiac risk index predictors- None     Functional capacity -Examples of physical activity - golf's 18 holes once - twice a month, farming, rides the tractor, does farm work,   can take 1 flight of stairs,  raking lawn , painting,  planting shrubs,  weeding garden,  swimming ,  dancing  and  digging --- He can undertake all the above activities without  chest pain,chest tightness, Shortness of breath ,dizziness,lightheadedness making his exercise tolerance more,   than 4 Mets.       Review of symptoms    ROS    Constitutional -as above ,No fever  Eyes- No new vision changes  ENT- sleep apnea  Cardiac-As above   Respiratory- dry cough  GI- Bowel movements  regular  No overt GI/ blood losses   -No dysuria and  no urinary hesitancy   MS-As above  Neurologic-No unilateral weakness   Psychiatric-anxiety  and  no suicidal, homicidal ideation   Endocrine-  Prednisone use for over 3 weeks -no  Hematological/Lymphatic-No spontaneous bruising, bleeding    Past Medical history- reviewed in EPIC-    Pertinent negatives-   DVT-  Pulmonary embolism-  Heart disease -  Vascular stenting -    Past Surgical history - reviewed in EPIC-    Most recent surgery / procedure- Orthopedic 2014  No Anaesthetic,Bleeding ,Cardiac problems with previous surgeries-  No history of delirium --  No history of post operative  nausea, vomiting -    Family history- reviewed in EPIC    Heart disease-father - age of onset -70's ( 65 plus  Thrombosis- None-  Anaesthetic complications- None-  Diabetes Mellitus - father - late onset, Type 2     Social history- reviewed in [facility]    From [place], came to [facility] for sister in law's transplant , plans on going back home  Lives with wife  Help available post op     Medications and Allergies - reviewed in EPIC    Exam    Physical  Exam  Constitutional- Vitals - Body mass index is 33.72 kg/m².,   Vitals:    06/28/17 0905   BP: 125/84   Pulse: 66   Temp: 97.6 °F (36.4 °C)   sat 96 %  General appearance-Conscious,Coherent  Eyes- No conjunctival icterus,pupils  round  and reactive to light   ENT-Oral cavity- moist  , Hearing grossly normal   Neck- No thyromegaly ,Trachea -central, No jugular venous distension,   No Carotid Bruit   Cardiovascular -Heart Sounds-sitting , reclining,  Normal  and  no murmur   , No gallop rhythm   Respiratory - Normal Respiratory Effort, Normal breath sounds and  no wheeze    Peripheral pitting pedal edema-- none , no calf pain   Gastrointestinal -Soft abdomen, No palpable masses, Non Tender,Liver,Spleen not palpable. No-- free fluid and shifting dullness  Musculoskeletal- No finger Clubbing. Strength grossly normal   Lymphatic-No Palpable cervical, axillary,Inguinal lymphadenopathy   Psychiatric - normal effect,Orientation  Rt Dorsalis pedis pulses-palpable    Lt Dorsalis pedis pulses- palpable   Rt Posterior tibial pulses -palpable   Left posterior tibial pulses -palpable   Miscellaneous -  no suggestion of bacterial feet infection and  no renal bruit    Investigations    Review of Medicine tests    EKG- I had independently reviewed the EKG from--today      Review of clinical lab tests-Date--- Creatinine-  Date--6/8/2017 Hemoglobin-N - Platelet count--N    Review of old records- Was done and information gathered regards to events leading to surgery and health conditions of significance in the perioperative period    Orders placed-bmp,a1c      Assessment and plan    New problems to me      Preoperative  risk assessment    Cardiac    Revised cardiac risk index ( RCRI ) predictors- 0---.Functional capacity  Is more than 4 Mets. He will be undergoing a Urological procedure that carries a intermediate risk     The estimated risk of the rate of adverse cardiac outcomes  < 1 %  No further cardiac work up is indicated prior  to proceeding with the surgery     American Society of Anesthesiologists Physical status classification ( ASA ) class- - 3    Postoperative pulmonary complication risk assessment    ARISCAT ( Canet) risk index- risk class -  Intermediate    Zainzull Respiratory failure index- percentage risk of respiratory failure- 0.5 %         Perioperative Medical management / Optimization    ASA -given EKG abnormality , will work with Surgeon, if acceptable to continue ASA  Holding for surgery    Hypertension-  Blood pressure is acceptable .  I suggest holding HCTZ on the morning of the surgery and can continue that  post operatively under blood pressure, electrolyte and renal function monitoring as long as they are acceptable.I suggest addressing pain control as uncontrolled pain can increased blood pressure     HLD-I  suggest continuation of statin during the entire perioperative period.    Hypokalemia  BMP ordered  Fruit intake suggested    Obesity  Suggested weight loss    Hyperglycemia  A1c ordered     Abnormal EKG   No suggestion of CAD    Acid reflux-I suggest aspiration precautions    sleep apnea- I suggested follow up and suggest  caution with usage of medication that can cause respiratory suppression in the perioperative period  potential ramifications of untreated sleep apnea, which could include daytime sleepiness, hypertension, heart disease and stroke were discussed       Preventive perioperative care    Thromboembolic prophylaxis:  His risk factors for thrombosis include cancer, obesity, surgical procedure and age.I suggest  thromboembolic prophylaxis ( mechanical/pharmacological, weighing the risk benefits of pharmacological agent use considering derrick procedural bleeding )  during the perioperative period.I suggested being active in the post operative period.     Postoperative pulmonary complication prophylaxis-Risk factors for post operative pulmonary complications include sleep apnea,  ASA class >2 and  proximity of the surgical site to the lungs- I suggest oral care , head end of bed of elevation, incentive Spirometry use ,  early ambulation  and  end tidal carbon dioxide  Monitoring       Renal complication prophylaxis-Risk factors for renal complications include Hypertension . I suggest keeping him well hydrated.I suggested drinking 2 litre's of water a day     Surgical site Infection Prophylaxis-I  suggest appropriate antibiotic for Prophylaxis against Surgical site infections    In view of LUTS- slower stream the patient  is at risk of postoperative urinary retention.  I suggest avoidance / minimizing the of  Benzodiazepines,Anticholinergic medication,antihistamines ( Benadryl) , if possible in the perioperative period. I suggest using the minimum possible use of opioids for the minimum period of time in the perioperative period. Benadryl avoidance suggested       This visit was focussed on Preoperative evaluation , Perioperative Medical management, complication reduction plans and I suggest that the patient follows up with the primary care ,relevant sub specialists for on going health care      I appreciate the opportunity to be involved in this  pleasant  gentleman's care.Please feel free to contact me if there were any questions about this consultation     Patient is optimized  for the planned surgery    Dr DANIELA Mcdaniel MD MRCP ( ),[facility]   [facility]  [facility]   Pager [phone], Cell [phone]  ------------------------------------    6/29- 8 55     EKG report    Normal sinus rhythm  Possible Inferior infarct ,age undetermined  No previous ECGs available  ---------------------------------------    6/29- 12 23     Had BMP,A1c today   Potassium normalized and is 3.5   Glucose was 160 with an A1c which is normal at  5.4     Called to follow up   Left a message about normal potassium, A1c   Suggested weight  loss  --------------------------------------  6/29- 17 43     Spoke to patient   Had dessert last last night , Waffle with syrup this morning   ASA - he is concerned about bleeding and wants to restart after done with surgery   Had been holding ASA for 1 week   ---    6/19- 17 50   Medication instructions discussed

## 2017-06-28 NOTE — LETTER
June 28, 2017      Rhonda G Leopold, MD  [address]  [place] [address]           [facility] - Pre Op Consult  [address] [facility] [address]  Phone: [phone]          Patient: Cristhian Mcintosh   MR Number: [id number]   YOB: 1955   Date of Visit: 6/28/2017       Dear Dr. Rhonda G Leopold:    Thank you for referring Cristhian Mcintosh to me for evaluation. Attached you will find relevant portions of my assessment and plan of care.    If you have questions, please do not hesitate to call me. I look forward to following Cristhian Mcintosh along with you.    Sincerely,    Nichelle Mcdaniel MD    Enclosure  CC:  Tulio Garcia MD    If you would like to receive this communication electronically, please contact [email or web address] or [phone] to request more information on [facility] Link access.    For providers and/or their staff who would like to refer a patient to [facility], please contact us through our one-stop-shop provider referral line, [facility], at [phone].    If you feel you have received this communication in error or would no longer like to receive these types of communications, please e-mail [email or web address]

## 2017-06-28 NOTE — DISCHARGE INSTRUCTIONS
Your surgery has been scheduled for:__________________________________________    You should report to:  ____[facility], located on the [place] side of the first floor of the           [facility] ([phone])  ____[facility], located on the [facility] side of the            Second floor of the [facility] ([phone])  ____3rd Floor [facility] located on the [facility] side of the [facility] [phone]  Please Note   - Tell your doctor if you take Aspirin, products containing Aspirin, herbal medications  or blood thinners, such as Coumadin, Ticlid, or Plavix.  (Consult your provider regarding holding or stopping before surgery).  - Arrange for someone to drive you home following surgery.  You will not be allowed to leave the surgical facility alone or drive yourself home following sedation and anesthesia.  Before Surgery  - Stop taking all herbal medications 14days prior to surgery  - No Motrin/Advil (Ibuprofen) 7 days before surgery  - No Aleve (Naproxen) 7 days before surgery  - Stop Taking Asprin, products containing Asprin _____days before surgery  - Stop taking blood thinners_______days before surgery  - Refrain from drinking alcoholic beverages for 24hours before and after surgery  - Stop or limit smoking _________days before surgery  Night before Surgery  - DO NOT EAT OR DRINK ANYTHING AFTER MIDNIGHT, INCLUDING GUM, HARD CANDY, MINTS, OR CHEWING TOBACCO.  - Take a shower or bath (shower is recommended).  Bathe with Hibiclens soap or an antibacterial soap from the neck down.  If not supplied by your surgeon, hibiclens soap will need to be purchased over the counter in pharmacy.  Rinse soap off thoroughly.  - Shampoo your hair with your regular shampoo  The Day of Surgery  - Take another bath or shower with hibiclens or any antibacterial soap, to reduce the chance of infection.  - Take heart and blood  pressure medications with a small sip of water, as advised by the perioperative team.  - Do not take fluid pills  - You may brush your teeth and rinse your mouth, but do not swall any additional water.   - Do not apply perfumes, powder, body lotions or deodorant on the day of surgery.  - Nail polish should be removed.  - Do not wear makeup or moisturizer  - Wear comfortable clothes, such as a button front shirt and loose fitting pants.  - Leave all jewelry, including body piercings, and valuables at home.    - Bring any devices you will neeed after surgery such as crutches or canes.  - If you have sleep apnea, please bring your CPAP machine  In the event that your physical condition changes including the onset of a cold or respiratory illness, or if you have to delay or cancel your surgery, please notify your surgeon.

## 2017-06-29 ENCOUNTER — PROCEDURE VISIT (OUTPATIENT)
Dept: UROLOGY | Facility: CLINIC | Age: 62
DRG: 658 | End: 2017-06-29
Payer: COMMERCIAL

## 2017-06-29 DIAGNOSIS — R31.9 HEMATURIA: ICD-10-CM

## 2017-06-29 PROBLEM — E87.6 HYPOKALEMIA: Status: RESOLVED | Noted: 2017-06-28 | Resolved: 2017-06-29

## 2017-06-29 PROCEDURE — 52000 CYSTOURETHROSCOPY: CPT | Mod: S$GLB,,, | Performed by: UROLOGY

## 2017-06-29 RX ORDER — CIPROFLOXACIN 2 MG/ML
400 INJECTION, SOLUTION INTRAVENOUS
Status: CANCELLED | OUTPATIENT
Start: 2017-06-29

## 2017-06-29 NOTE — PROCEDURES
Procedures   Procedures: Flexible cystourethroscopy    Pre Procedure Diagnosis:gross hematuria    Post Procedure Diagnosis:Normal cystoscopy    Surgeon: Tulio Garcia MD    Anesthesia: 2% uro-jet lidocaine jelly for local analgesia    Flexible cysto-urethroscopy was performed after consent was obtained.  The risks and benefits were explained.    2% lidocaine urojet was used for local analgesia.  The genitalia was prepped and draped in the sterile fashion with betadine.    The flexible scope was advanced into the urethra and into the bladder.  Bilateral ureteral orifice were evaluated and noted to be normal with clear efflux.  The bladder was completely surveyed in a systematic fashion.   No bladder tumors or lesions were seen.  No strictures were noted.  The prostate showed Mild hypertrophy.  There was No median lobe present.    The patient tolerated the procedure well without complication.    They will follow up in 1 day(s) for robotic nephrectomy.

## 2017-06-30 ENCOUNTER — ANESTHESIA (OUTPATIENT)
Dept: SURGERY | Facility: HOSPITAL | Age: 62
DRG: 658 | End: 2017-06-30
Payer: COMMERCIAL

## 2017-06-30 ENCOUNTER — SURGERY (OUTPATIENT)
Age: 62
End: 2017-06-30

## 2017-06-30 ENCOUNTER — HOSPITAL ENCOUNTER (INPATIENT)
Facility: HOSPITAL | Age: 62
LOS: 1 days | Discharge: HOME | DRG: 658 | End: 2017-07-01
Attending: UROLOGY | Admitting: UROLOGY
Payer: COMMERCIAL

## 2017-06-30 DIAGNOSIS — N28.89 RENAL MASS, LEFT: ICD-10-CM

## 2017-06-30 LAB
ANION GAP SERPL CALC-SCNC: 11 MMOL/L
BASOPHILS # BLD AUTO: 0.01 K/UL
BASOPHILS NFR BLD: 0.1 %
BUN SERPL-MCNC: 12 MG/DL
CALCIUM SERPL-MCNC: 7.6 MG/DL
CHLORIDE SERPL-SCNC: 106 MMOL/L
CO2 SERPL-SCNC: 23 MMOL/L
CREAT SERPL-MCNC: 1.1 MG/DL
DIFFERENTIAL METHOD: ABNORMAL
EOSINOPHIL # BLD AUTO: 0 K/UL
EOSINOPHIL NFR BLD: 0.1 %
ERYTHROCYTE [DISTWIDTH] IN BLOOD BY AUTOMATED COUNT: 12.6 %
EST. GFR  (AFRICAN AMERICAN): >60 ML/MIN/1.73 M^2
EST. GFR  (NON AFRICAN AMERICAN): >60 ML/MIN/1.73 M^2
GLUCOSE SERPL-MCNC: 138 MG/DL (ref 70–110)
GLUCOSE SERPL-MCNC: 141 MG/DL
HCO3 UR-SCNC: 18.6 MMOL/L (ref 24–28)
HCO3 UR-SCNC: 21.9 MMOL/L (ref 24–28)
HCT VFR BLD AUTO: 37.7 %
HCT VFR BLD CALC: 32 %PCV (ref 36–54)
HGB BLD-MCNC: 12.9 G/DL
LDH SERPL L TO P-CCNC: 2.28 MMOL/L (ref 0.36–1.25)
LYMPHOCYTES # BLD AUTO: 0.7 K/UL
LYMPHOCYTES NFR BLD: 5.5 %
MCH RBC QN AUTO: 29.5 PG
MCHC RBC AUTO-ENTMCNC: 34.2 %
MCV RBC AUTO: 86 FL
MONOCYTES # BLD AUTO: 0.3 K/UL
MONOCYTES NFR BLD: 2.4 %
NEUTROPHILS # BLD AUTO: 11 K/UL
NEUTROPHILS NFR BLD: 91.3 %
PCO2 BLDA: 36 MMHG (ref 35–45)
PCO2 BLDA: 43.1 MMHG (ref 35–45)
PH SMN: 7.31 [PH] (ref 7.35–7.45)
PH SMN: 7.32 [PH] (ref 7.35–7.45)
PLATELET # BLD AUTO: 266 K/UL
PMV BLD AUTO: 9.5 FL
PO2 BLDA: 131 MMHG (ref 80–100)
PO2 BLDA: 203 MMHG (ref 80–100)
POC BE: -4 MMOL/L
POC BE: -7 MMOL/L
POC IONIZED CALCIUM: 0.96 MMOL/L (ref 1.06–1.42)
POC SATURATED O2: 100 % (ref 95–100)
POC SATURATED O2: 99 % (ref 95–100)
POC TCO2: 20 MMOL/L (ref 23–27)
POC TCO2: 23 MMOL/L (ref 23–27)
POTASSIUM BLD-SCNC: 3.3 MMOL/L (ref 3.5–5.1)
POTASSIUM SERPL-SCNC: 3.9 MMOL/L
RBC # BLD AUTO: 4.38 M/UL
SAMPLE: ABNORMAL
SAMPLE: ABNORMAL
SODIUM BLD-SCNC: 144 MMOL/L (ref 136–145)
SODIUM SERPL-SCNC: 140 MMOL/L
WBC # BLD AUTO: 12.05 K/UL

## 2017-06-30 PROCEDURE — 80048 BASIC METABOLIC PNL TOTAL CA: CPT

## 2017-06-30 PROCEDURE — 25000003 PHARM REV CODE 250: Performed by: UROLOGY

## 2017-06-30 PROCEDURE — 36000712 HC OR TIME LEV V 1ST 15 MIN: Performed by: UROLOGY

## 2017-06-30 PROCEDURE — 8E0W4CZ ROBOTIC ASSISTED PROCEDURE OF TRUNK REGION, PERCUTANEOUS ENDOSCOPIC APPROACH: ICD-10-PCS | Performed by: UROLOGY

## 2017-06-30 PROCEDURE — 88307 TISSUE EXAM BY PATHOLOGIST: CPT | Mod: 26,,, | Performed by: PATHOLOGY

## 2017-06-30 PROCEDURE — 71000039 HC RECOVERY, EACH ADD'L HOUR: Performed by: UROLOGY

## 2017-06-30 PROCEDURE — 50545 LAPARO RADICAL NEPHRECTOMY: CPT | Mod: LT,,, | Performed by: UROLOGY

## 2017-06-30 PROCEDURE — 63600175 PHARM REV CODE 636 W HCPCS: Performed by: UROLOGY

## 2017-06-30 PROCEDURE — 25000003 PHARM REV CODE 250

## 2017-06-30 PROCEDURE — 0TT14ZZ RESECTION OF LEFT KIDNEY, PERCUTANEOUS ENDOSCOPIC APPROACH: ICD-10-PCS | Performed by: UROLOGY

## 2017-06-30 PROCEDURE — D9220A PRA ANESTHESIA: Mod: CRNA,,, | Performed by: NURSE ANESTHETIST, CERTIFIED REGISTERED

## 2017-06-30 PROCEDURE — 36620 INSERTION CATHETER ARTERY: CPT | Mod: 59,,, | Performed by: ANESTHESIOLOGY

## 2017-06-30 PROCEDURE — 27201423 OPTIME MED/SURG SUP & DEVICES STERILE SUPPLY: Performed by: UROLOGY

## 2017-06-30 PROCEDURE — 94760 N-INVAS EAR/PLS OXIMETRY 1: CPT

## 2017-06-30 PROCEDURE — 36000713 HC OR TIME LEV V EA ADD 15 MIN: Performed by: UROLOGY

## 2017-06-30 PROCEDURE — 63600175 PHARM REV CODE 636 W HCPCS: Performed by: ANESTHESIOLOGY

## 2017-06-30 PROCEDURE — 50545 LAPARO RADICAL NEPHRECTOMY: CPT | Mod: AS,LT,, | Performed by: PHYSICIAN ASSISTANT

## 2017-06-30 PROCEDURE — 27201037 HC PRESSURE MONITORING SET UP

## 2017-06-30 PROCEDURE — 11000001 HC ACUTE MED/SURG PRIVATE ROOM

## 2017-06-30 PROCEDURE — 88307 TISSUE EXAM BY PATHOLOGIST: CPT | Performed by: PATHOLOGY

## 2017-06-30 PROCEDURE — 85025 COMPLETE CBC W/AUTO DIFF WBC: CPT

## 2017-06-30 PROCEDURE — 63600175 PHARM REV CODE 636 W HCPCS: Performed by: STUDENT IN AN ORGANIZED HEALTH CARE EDUCATION/TRAINING PROGRAM

## 2017-06-30 PROCEDURE — 37000009 HC ANESTHESIA EA ADD 15 MINS: Performed by: UROLOGY

## 2017-06-30 PROCEDURE — 63600175 PHARM REV CODE 636 W HCPCS

## 2017-06-30 PROCEDURE — 37000008 HC ANESTHESIA 1ST 15 MINUTES: Performed by: UROLOGY

## 2017-06-30 PROCEDURE — D9220A PRA ANESTHESIA: Mod: ANES,,, | Performed by: ANESTHESIOLOGY

## 2017-06-30 PROCEDURE — 25000003 PHARM REV CODE 250: Performed by: ANESTHESIOLOGY

## 2017-06-30 PROCEDURE — 25000003 PHARM REV CODE 250: Performed by: STUDENT IN AN ORGANIZED HEALTH CARE EDUCATION/TRAINING PROGRAM

## 2017-06-30 PROCEDURE — 27000221 HC OXYGEN, UP TO 24 HOURS

## 2017-06-30 PROCEDURE — 71000033 HC RECOVERY, INTIAL HOUR: Performed by: UROLOGY

## 2017-06-30 RX ORDER — SODIUM CHLORIDE 0.9 % (FLUSH) 0.9 %
3 SYRINGE (ML) INJECTION EVERY 8 HOURS
Status: DISCONTINUED | OUTPATIENT
Start: 2017-06-30 | End: 2017-06-30 | Stop reason: HOSPADM

## 2017-06-30 RX ORDER — OXYCODONE HYDROCHLORIDE 5 MG/1
TABLET ORAL
Status: COMPLETED
Start: 2017-06-30 | End: 2017-06-30

## 2017-06-30 RX ORDER — GLYCOPYRROLATE 0.2 MG/ML
INJECTION INTRAMUSCULAR; INTRAVENOUS
Status: DISCONTINUED | OUTPATIENT
Start: 2017-06-30 | End: 2017-06-30

## 2017-06-30 RX ORDER — TAMSULOSIN HYDROCHLORIDE 0.4 MG/1
0.4 CAPSULE ORAL NIGHTLY
Status: DISCONTINUED | OUTPATIENT
Start: 2017-06-30 | End: 2017-07-01 | Stop reason: HOSPADM

## 2017-06-30 RX ORDER — CLINDAMYCIN PHOSPHATE 900 MG/50ML
900 INJECTION, SOLUTION INTRAVENOUS
Status: COMPLETED | OUTPATIENT
Start: 2017-06-30 | End: 2017-06-30

## 2017-06-30 RX ORDER — LORAZEPAM 0.5 MG/1
1 TABLET ORAL 3 TIMES DAILY PRN
Status: DISCONTINUED | OUTPATIENT
Start: 2017-06-30 | End: 2017-07-01 | Stop reason: HOSPADM

## 2017-06-30 RX ORDER — ROCURONIUM BROMIDE 10 MG/ML
INJECTION, SOLUTION INTRAVENOUS
Status: DISCONTINUED | OUTPATIENT
Start: 2017-06-30 | End: 2017-06-30

## 2017-06-30 RX ORDER — PROPOFOL 10 MG/ML
INJECTION, EMULSION INTRAVENOUS
Status: DISCONTINUED | OUTPATIENT
Start: 2017-06-30 | End: 2017-06-30

## 2017-06-30 RX ORDER — ONDANSETRON 2 MG/ML
4 INJECTION INTRAMUSCULAR; INTRAVENOUS EVERY 8 HOURS PRN
Status: DISCONTINUED | OUTPATIENT
Start: 2017-06-30 | End: 2017-07-01 | Stop reason: HOSPADM

## 2017-06-30 RX ORDER — PRAVASTATIN SODIUM 40 MG/1
40 TABLET ORAL NIGHTLY
Status: DISCONTINUED | OUTPATIENT
Start: 2017-06-30 | End: 2017-07-01 | Stop reason: HOSPADM

## 2017-06-30 RX ORDER — LIDOCAINE HYDROCHLORIDE 10 MG/ML
1 INJECTION, SOLUTION EPIDURAL; INFILTRATION; INTRACAUDAL; PERINEURAL ONCE
Status: COMPLETED | OUTPATIENT
Start: 2017-06-30 | End: 2017-06-30

## 2017-06-30 RX ORDER — PHENYLEPHRINE HYDROCHLORIDE 10 MG/ML
INJECTION INTRAVENOUS
Status: DISCONTINUED | OUTPATIENT
Start: 2017-06-30 | End: 2017-06-30

## 2017-06-30 RX ORDER — SODIUM CHLORIDE 9 MG/ML
INJECTION, SOLUTION INTRAVENOUS CONTINUOUS
Status: DISCONTINUED | OUTPATIENT
Start: 2017-06-30 | End: 2017-07-01 | Stop reason: HOSPADM

## 2017-06-30 RX ORDER — DEXAMETHASONE SODIUM PHOSPHATE 4 MG/ML
INJECTION, SOLUTION INTRA-ARTICULAR; INTRALESIONAL; INTRAMUSCULAR; INTRAVENOUS; SOFT TISSUE
Status: DISCONTINUED | OUTPATIENT
Start: 2017-06-30 | End: 2017-06-30

## 2017-06-30 RX ORDER — HYDROMORPHONE HYDROCHLORIDE 1 MG/ML
0.2 INJECTION, SOLUTION INTRAMUSCULAR; INTRAVENOUS; SUBCUTANEOUS EVERY 5 MIN PRN
Status: DISCONTINUED | OUTPATIENT
Start: 2017-06-30 | End: 2017-06-30 | Stop reason: HOSPADM

## 2017-06-30 RX ORDER — ACETAMINOPHEN 10 MG/ML
INJECTION, SOLUTION INTRAVENOUS
Status: DISCONTINUED | OUTPATIENT
Start: 2017-06-30 | End: 2017-06-30

## 2017-06-30 RX ORDER — HYDROMORPHONE HYDROCHLORIDE 1 MG/ML
INJECTION, SOLUTION INTRAMUSCULAR; INTRAVENOUS; SUBCUTANEOUS
Status: COMPLETED
Start: 2017-06-30 | End: 2017-06-30

## 2017-06-30 RX ORDER — SODIUM CHLORIDE 0.9 % (FLUSH) 0.9 %
3 SYRINGE (ML) INJECTION
Status: DISCONTINUED | OUTPATIENT
Start: 2017-06-30 | End: 2017-06-30 | Stop reason: HOSPADM

## 2017-06-30 RX ORDER — FAMOTIDINE 20 MG/1
20 TABLET, FILM COATED ORAL 2 TIMES DAILY
Status: DISCONTINUED | OUTPATIENT
Start: 2017-06-30 | End: 2017-07-01 | Stop reason: HOSPADM

## 2017-06-30 RX ORDER — SODIUM CHLORIDE 9 MG/ML
INJECTION, SOLUTION INTRAVENOUS CONTINUOUS
Status: DISCONTINUED | OUTPATIENT
Start: 2017-06-30 | End: 2017-06-30

## 2017-06-30 RX ORDER — OXYCODONE HYDROCHLORIDE 5 MG/1
10 TABLET ORAL EVERY 4 HOURS PRN
Status: DISCONTINUED | OUTPATIENT
Start: 2017-06-30 | End: 2017-07-01 | Stop reason: HOSPADM

## 2017-06-30 RX ORDER — LIDOCAINE HCL/PF 100 MG/5ML
SYRINGE (ML) INTRAVENOUS
Status: DISCONTINUED | OUTPATIENT
Start: 2017-06-30 | End: 2017-06-30

## 2017-06-30 RX ORDER — ASPIRIN 81 MG/1
81 TABLET ORAL DAILY
Status: DISCONTINUED | OUTPATIENT
Start: 2017-06-30 | End: 2017-07-01 | Stop reason: HOSPADM

## 2017-06-30 RX ORDER — HYDROCHLOROTHIAZIDE 25 MG/1
25 TABLET ORAL EVERY MORNING
Status: DISCONTINUED | OUTPATIENT
Start: 2017-06-30 | End: 2017-07-01 | Stop reason: HOSPADM

## 2017-06-30 RX ORDER — SODIUM CHLORIDE 9 MG/ML
INJECTION, SOLUTION INTRAVENOUS CONTINUOUS PRN
Status: DISCONTINUED | OUTPATIENT
Start: 2017-06-30 | End: 2017-06-30

## 2017-06-30 RX ORDER — HYDROMORPHONE HYDROCHLORIDE 1 MG/ML
1 INJECTION, SOLUTION INTRAMUSCULAR; INTRAVENOUS; SUBCUTANEOUS
Status: DISCONTINUED | OUTPATIENT
Start: 2017-06-30 | End: 2017-07-01 | Stop reason: HOSPADM

## 2017-06-30 RX ORDER — DIPHENHYDRAMINE HCL 25 MG
25 CAPSULE ORAL EVERY 6 HOURS PRN
Status: DISCONTINUED | OUTPATIENT
Start: 2017-06-30 | End: 2017-07-01 | Stop reason: HOSPADM

## 2017-06-30 RX ORDER — MIDAZOLAM HYDROCHLORIDE 1 MG/ML
INJECTION INTRAMUSCULAR; INTRAVENOUS
Status: DISCONTINUED | OUTPATIENT
Start: 2017-06-30 | End: 2017-06-30

## 2017-06-30 RX ORDER — ACETAMINOPHEN 10 MG/ML
1000 INJECTION, SOLUTION INTRAVENOUS EVERY 8 HOURS
Status: COMPLETED | OUTPATIENT
Start: 2017-06-30 | End: 2017-07-01

## 2017-06-30 RX ORDER — POLYETHYLENE GLYCOL 3350 17 G/17G
17 POWDER, FOR SOLUTION ORAL 2 TIMES DAILY
Status: DISCONTINUED | OUTPATIENT
Start: 2017-06-30 | End: 2017-07-01 | Stop reason: HOSPADM

## 2017-06-30 RX ORDER — KETAMINE HYDROCHLORIDE 100 MG/ML
INJECTION, SOLUTION INTRAMUSCULAR; INTRAVENOUS
Status: DISCONTINUED | OUTPATIENT
Start: 2017-06-30 | End: 2017-06-30

## 2017-06-30 RX ORDER — LIDOCAINE HYDROCHLORIDE ANHYDROUS AND DEXTROSE MONOHYDRATE .8; 5 G/100ML; G/100ML
INJECTION, SOLUTION INTRAVENOUS CONTINUOUS PRN
Status: DISCONTINUED | OUTPATIENT
Start: 2017-06-30 | End: 2017-06-30

## 2017-06-30 RX ORDER — FENTANYL CITRATE 50 UG/ML
INJECTION, SOLUTION INTRAMUSCULAR; INTRAVENOUS
Status: DISCONTINUED | OUTPATIENT
Start: 2017-06-30 | End: 2017-06-30

## 2017-06-30 RX ORDER — ONDANSETRON 2 MG/ML
INJECTION INTRAMUSCULAR; INTRAVENOUS
Status: DISCONTINUED | OUTPATIENT
Start: 2017-06-30 | End: 2017-06-30

## 2017-06-30 RX ORDER — NAPROXEN SODIUM 220 MG/1
81 TABLET, FILM COATED ORAL DAILY
Status: DISCONTINUED | OUTPATIENT
Start: 2017-06-30 | End: 2017-06-30

## 2017-06-30 RX ORDER — OXYCODONE HYDROCHLORIDE 5 MG/1
5 TABLET ORAL EVERY 4 HOURS PRN
Status: DISCONTINUED | OUTPATIENT
Start: 2017-06-30 | End: 2017-07-01 | Stop reason: HOSPADM

## 2017-06-30 RX ORDER — ONDANSETRON 2 MG/ML
4 INJECTION INTRAMUSCULAR; INTRAVENOUS DAILY PRN
Status: DISCONTINUED | OUTPATIENT
Start: 2017-06-30 | End: 2017-06-30 | Stop reason: HOSPADM

## 2017-06-30 RX ORDER — NEOSTIGMINE METHYLSULFATE 1 MG/ML
INJECTION, SOLUTION INTRAVENOUS
Status: DISCONTINUED | OUTPATIENT
Start: 2017-06-30 | End: 2017-06-30

## 2017-06-30 RX ADMIN — SODIUM CHLORIDE: 0.9 INJECTION, SOLUTION INTRAVENOUS at 08:06

## 2017-06-30 RX ADMIN — PROPOFOL 50 MG: 10 INJECTION, EMULSION INTRAVENOUS at 08:06

## 2017-06-30 RX ADMIN — HYDROMORPHONE HYDROCHLORIDE 0.2 MG: 1 INJECTION, SOLUTION INTRAMUSCULAR; INTRAVENOUS; SUBCUTANEOUS at 02:06

## 2017-06-30 RX ADMIN — HYDROMORPHONE HYDROCHLORIDE 0.2 MG: 1 INJECTION, SOLUTION INTRAMUSCULAR; INTRAVENOUS; SUBCUTANEOUS at 01:06

## 2017-06-30 RX ADMIN — FENTANYL CITRATE 50 MCG: 50 INJECTION, SOLUTION INTRAMUSCULAR; INTRAVENOUS at 07:06

## 2017-06-30 RX ADMIN — GLYCOPYRROLATE 0.6 MG: 0.2 INJECTION, SOLUTION INTRAMUSCULAR; INTRAVENOUS at 01:06

## 2017-06-30 RX ADMIN — CLINDAMYCIN IN 5 PERCENT DEXTROSE 900 MG: 18 INJECTION, SOLUTION INTRAVENOUS at 08:06

## 2017-06-30 RX ADMIN — KETAMINE HYDROCHLORIDE 30 MG: 100 INJECTION, SOLUTION, CONCENTRATE INTRAMUSCULAR; INTRAVENOUS at 09:06

## 2017-06-30 RX ADMIN — PHENYLEPHRINE HYDROCHLORIDE 200 MCG: 10 INJECTION INTRAVENOUS at 09:06

## 2017-06-30 RX ADMIN — OXYCODONE HYDROCHLORIDE 10 MG: 5 TABLET ORAL at 01:06

## 2017-06-30 RX ADMIN — DEXMEDETOMIDINE HYDROCHLORIDE 0.5 MCG/KG/HR: 100 INJECTION, SOLUTION, CONCENTRATE INTRAVENOUS at 08:06

## 2017-06-30 RX ADMIN — PHENYLEPHRINE HYDROCHLORIDE 100 MCG: 10 INJECTION INTRAVENOUS at 10:06

## 2017-06-30 RX ADMIN — LIDOCAINE HYDROCHLORIDE 0.2 MG: 10 INJECTION, SOLUTION EPIDURAL; INFILTRATION; INTRACAUDAL; PERINEURAL at 07:06

## 2017-06-30 RX ADMIN — ASPIRIN 81 MG: 81 TABLET, COATED ORAL at 06:06

## 2017-06-30 RX ADMIN — SODIUM CHLORIDE: 0.9 INJECTION, SOLUTION INTRAVENOUS at 02:06

## 2017-06-30 RX ADMIN — ROCURONIUM BROMIDE 50 MG: 10 INJECTION, SOLUTION INTRAVENOUS at 10:06

## 2017-06-30 RX ADMIN — PHENYLEPHRINE HYDROCHLORIDE 100 MCG: 10 INJECTION INTRAVENOUS at 12:06

## 2017-06-30 RX ADMIN — ONDANSETRON 4 MG: 2 INJECTION INTRAMUSCULAR; INTRAVENOUS at 09:06

## 2017-06-30 RX ADMIN — ASPIRIN 81 MG CHEWABLE TABLET 81 MG: 81 TABLET CHEWABLE at 07:06

## 2017-06-30 RX ADMIN — SODIUM CHLORIDE: 0.9 INJECTION, SOLUTION INTRAVENOUS at 07:06

## 2017-06-30 RX ADMIN — OXYCODONE HYDROCHLORIDE 10 MG: 5 TABLET ORAL at 10:06

## 2017-06-30 RX ADMIN — PROPOFOL 200 MG: 10 INJECTION, EMULSION INTRAVENOUS at 07:06

## 2017-06-30 RX ADMIN — HYDROMORPHONE HYDROCHLORIDE 0.2 MG: 1 INJECTION, SOLUTION INTRAMUSCULAR; INTRAVENOUS; SUBCUTANEOUS at 03:06

## 2017-06-30 RX ADMIN — FAMOTIDINE 20 MG: 20 TABLET, FILM COATED ORAL at 08:06

## 2017-06-30 RX ADMIN — PHENYLEPHRINE HYDROCHLORIDE 200 MCG: 10 INJECTION INTRAVENOUS at 11:06

## 2017-06-30 RX ADMIN — ROCURONIUM BROMIDE 50 MG: 10 INJECTION, SOLUTION INTRAVENOUS at 07:06

## 2017-06-30 RX ADMIN — DEXAMETHASONE SODIUM PHOSPHATE 8 MG: 4 INJECTION, SOLUTION INTRAMUSCULAR; INTRAVENOUS at 08:06

## 2017-06-30 RX ADMIN — CALCIUM GLUCONATE 1000 MG: 94 INJECTION, SOLUTION INTRAVENOUS at 05:06

## 2017-06-30 RX ADMIN — NEOSTIGMINE METHYLSULFATE 5 MG: 1 INJECTION INTRAVENOUS at 01:06

## 2017-06-30 RX ADMIN — PROPOFOL 20 MG: 10 INJECTION, EMULSION INTRAVENOUS at 11:06

## 2017-06-30 RX ADMIN — TAMSULOSIN HYDROCHLORIDE 0.4 MG: 0.4 CAPSULE ORAL at 08:06

## 2017-06-30 RX ADMIN — KETAMINE HYDROCHLORIDE 10 MG: 100 INJECTION, SOLUTION, CONCENTRATE INTRAMUSCULAR; INTRAVENOUS at 07:06

## 2017-06-30 RX ADMIN — LORAZEPAM 1 MG: 0.5 TABLET ORAL at 06:06

## 2017-06-30 RX ADMIN — ACETAMINOPHEN 1000 MG: 10 INJECTION, SOLUTION INTRAVENOUS at 06:06

## 2017-06-30 RX ADMIN — POLYETHYLENE GLYCOL 3350 17 G: 17 POWDER, FOR SOLUTION ORAL at 08:06

## 2017-06-30 RX ADMIN — PRAVASTATIN SODIUM 40 MG: 40 TABLET ORAL at 08:06

## 2017-06-30 RX ADMIN — ACETAMINOPHEN 1000 MG: 10 INJECTION, SOLUTION INTRAVENOUS at 08:06

## 2017-06-30 RX ADMIN — LIDOCAINE HYDROCHLORIDE 100 MG: 20 INJECTION, SOLUTION INTRAVENOUS at 07:06

## 2017-06-30 RX ADMIN — PHENYLEPHRINE HYDROCHLORIDE 100 MCG: 10 INJECTION INTRAVENOUS at 09:06

## 2017-06-30 RX ADMIN — OXYCODONE HYDROCHLORIDE 10 MG: 5 TABLET ORAL at 06:06

## 2017-06-30 RX ADMIN — ROCURONIUM BROMIDE 50 MG: 10 INJECTION, SOLUTION INTRAVENOUS at 08:06

## 2017-06-30 RX ADMIN — SODIUM CHLORIDE, PRESERVATIVE FREE 3 ML: 5 INJECTION INTRAVENOUS at 02:06

## 2017-06-30 RX ADMIN — SODIUM CHLORIDE, SODIUM GLUCONATE, SODIUM ACETATE, POTASSIUM CHLORIDE, MAGNESIUM CHLORIDE, SODIUM PHOSPHATE, DIBASIC, AND POTASSIUM PHOSPHATE: .53; .5; .37; .037; .03; .012; .00082 INJECTION, SOLUTION INTRAVENOUS at 12:06

## 2017-06-30 RX ADMIN — LIDOCAINE HYDROCHLORIDE 0.03 MG/KG/MIN: 8 INJECTION, SOLUTION INTRAVENOUS at 08:06

## 2017-06-30 RX ADMIN — SODIUM CHLORIDE, SODIUM GLUCONATE, SODIUM ACETATE, POTASSIUM CHLORIDE, MAGNESIUM CHLORIDE, SODIUM PHOSPHATE, DIBASIC, AND POTASSIUM PHOSPHATE: .53; .5; .37; .037; .03; .012; .00082 INJECTION, SOLUTION INTRAVENOUS at 08:06

## 2017-06-30 RX ADMIN — HYDROCHLOROTHIAZIDE 25 MG: 25 TABLET ORAL at 06:06

## 2017-06-30 RX ADMIN — MIDAZOLAM HYDROCHLORIDE 2 MG: 1 INJECTION, SOLUTION INTRAMUSCULAR; INTRAVENOUS at 07:06

## 2017-06-30 NOTE — OR NURSING
All DaVinci instruments inspected before case by Adria Cooks. All instruments appear to be intact. Family updated by nurse liaison @0900, 1040, and 1200. All DaVinci instruments inspected after case by Adria Cooks  . All instruments appear to be intact.

## 2017-06-30 NOTE — TRANSFER OF CARE
"Anesthesia Transfer of Care Note    Patient: Cristhian Mcintosh    Procedure(s) Performed: Procedure(s) (LRB):  ROBOTIC ASSISTED LAPAROSCOPIC NEPHRECTOMY (Left)    Patient location: PACU    Anesthesia Type: general    Transport from OR: Transported from OR on room air with adequate spontaneous ventilation. Transported from OR on 6-10 L/min O2 by face mask with adequate spontaneous ventilation    Post pain: adequate analgesia    Post assessment: no apparent anesthetic complications and tolerated procedure well    Post vital signs: stable    Level of consciousness: awake and alert    Nausea/Vomiting: no nausea/vomiting    Complications: none    Transfer of care protocol was followed      Last vitals:   Visit Vitals  BP (!) 150/99 (BP Location: Left arm, Patient Position: Lying, BP Method: Automatic)   Pulse 78   Temp 36.8 °C (98.3 °F) (Oral)   Resp 20   Ht 5' 10" (1.778 m)   Wt 105.7 kg (233 lb)   SpO2 96%   BMI 33.43 kg/m²     "

## 2017-06-30 NOTE — NURSING TRANSFER
Nursing Transfer Note      6/30/2017     Transfer 502    Transfer via stretcher    Transfer with 2L O2    Transported by PCT    Medicines sent: IVF    Chart send with patient: Yes    Notified: spouse    Patient reassessed at: 6/30/17 at 1800    Report given to admit RNLiliana. Pt resting in bed, arouses to voice. VSS; no distress noted at this time.

## 2017-06-30 NOTE — ANESTHESIA PROCEDURE NOTES
Arterial    Diagnosis: nephrectomy    Patient location during procedure: done in OR  Procedure start time: 6/30/2017 8:08 AM  Timeout: 6/30/2017 8:08 AM  Procedure end time: 6/30/2017 8:15 AM  Staffing  Anesthesiologist: RORY VALENTINE  Resident/CRNA: MARIA HOFF  Performed: anesthesiologist and resident/CRNA   Anesthesiologist was present at the time of the procedure.  Preanesthetic Checklist  Completed: patient identified, site marked, pre-op evaluation, timeout performed, IV checked and monitors and equipment checkedArterial  Skin Prep: chlorhexidine gluconate  Local Infiltration: none  Orientation: right  Location: radial  Catheter Size: 20 G  Catheter placement by Anatomical landmarks. Heme positive aspiration all ports.Insertion Attempts: 4 or more (First three attempts by anesthesia resident, good pulse, 4th successful inserted by anesthesiologist.)  Assessment  Dressing: secured with tape and tegaderm  Patient: Tolerated well

## 2017-06-30 NOTE — H&P (VIEW-ONLY)
CHIEF COMPLAINT:    Mr. Mcintosh is a 62 y.o. male presenting for a consultation at the request of Dr. Louis. Patient presents with gross hematuria.    PRESENTING ILLNESS:    Cristhian Mcintosh is a 62 y.o. male with HTN, HLD who presented to the ED 6/8/2017 with gross hematuria x 24 hours.  No prior history of hematuria.  No history of trauma.  CT RSS was performed, which demonstrated a 9.8 cm left lower pole renal mass.  The patient is a former smoker, 15 pack years.  Denies fever, chills, N/V, dysuria, flank pain.      Patient complains of hesitancy, slightly decreased FOS, nocturia x 2.  Denies intermittency, frequency, urgency. He is not bothered by his LUTS.  Patient denies ED.     REVIEW OF SYSTEMS:  All other systems reviewed and negative except pertinent positives noted in HPI.      PATIENT HISTORY:    Past Medical History:   Diagnosis Date    High cholesterol     Hypertension        Past Surgical History:   Procedure Laterality Date    ROTATOR CUFF REPAIR Right        Family History   Problem Relation Age of Onset    Prostate cancer Neg Hx     Kidney disease Neg Hx        Social History     Social History    Marital status:      Spouse name: N/A    Number of children: N/A    Years of education: N/A     Occupational History    Not on file.     Social History Main Topics    Smoking status: Former Smoker     Quit date: 6/8/1997    Smokeless tobacco: Never Used    Alcohol use Yes      Comment: occassional    Drug use: No    Sexual activity: Not Currently     Other Topics Concern    Not on file     Social History Narrative    No narrative on file       Allergies:  Penicillins    Medications:    Current Outpatient Prescriptions:     aspirin (ECOTRIN) 81 MG EC tablet, Take 81 mg by mouth once daily., Disp: , Rfl:     atorvastatin (LIPITOR) 40 MG tablet, Take by mouth., Disp: , Rfl:     diazePAM (VALIUM) 2 MG tablet, Take 1 mg by mouth every 6 (six) hours as needed for Anxiety., Disp: ,  Rfl:     hydrochlorothiazide (HYDRODIURIL) 25 MG tablet, Take 25 mg by mouth once daily., Disp: , Rfl:     hydrochlorothiazide (HYDRODIURIL) 25 MG tablet, TAKE 1/2 TABLET BY MOUTH DAILY, Disp: , Rfl:     lorazepam (ATIVAN) 1 MG tablet, TAKE ONE TABLET BY MOUTH THREE TIMES DAILY AS NEEDED FOR ANXIETY., Disp: , Rfl:     pravastatin (PRAVACHOL) 10 MG tablet, Take 10 mg by mouth once daily., Disp: , Rfl:     pravastatin (PRAVACHOL) 40 MG tablet, , Disp: , Rfl:     zolpidem (AMBIEN CR) 12.5 MG CR tablet, TAKE 1 TABLET BY MOUTH EVERY DAY AT BEDTIME AS NEEDED FOR INSOMNIA, Disp: , Rfl:     PHYSICAL EXAMINATION:    The patient generally appears in good health, is appropriately interactive, and is in no apparent distress.     Eyes: anicteric sclerae, moist conjunctivae; no lid-lag    HENT: Normocephalic, atraumatic    Musculoskeletal: No abnormal gait.    Mental: Cooperative with normal affect.  Is oriented to time, place, and person.    Neuro: Grossly intact.    Chest: Normal inspiratory effort.   No accessory muscles.  No audible wheezes.  Respirations symmetric on inspiration and expiration.    Heart: Regular rhythm.      Abdomen:  Soft, non-tender. No masses or organomegaly. Bladder is not palpable. No evidence of flank discomfort. No palpable abdominal or flank mass.     Genitourinary: The penis is circumcised with no evidence of plaques or induration. The urethral meatus is normal. The testes, epididymides, and cord structures are normal in size and contour bilaterally. The scrotum is normal in size and contour.    Normal anal sphincter tone. No rectal mass.    The prostate is 20 g. Normal landmarks. Lateral sulci. Median furrow intact.  No nodularity or induration. Seminal vesicles are normal.    Extremities: No clubbing, cyanosis, or edema      LABS:    Lab Results   Component Value Date    WBC 9.96 06/08/2017    HGB 15.4 06/08/2017    HCT 45.2 06/08/2017    MCV 87 06/08/2017     06/08/2017     BMP  Lab  Results   Component Value Date     06/08/2017    K 3.3 (L) 06/08/2017     06/08/2017    CO2 27 06/08/2017    BUN 15 06/08/2017    CREATININE 1.2 06/08/2017    CALCIUM 9.9 06/08/2017    ANIONGAP 10 06/08/2017    ESTGFRAFRICA >60.0 06/08/2017    EGFRNONAA >60.0 06/08/2017     Lab Results   Component Value Date    ALT 21 06/08/2017    AST 23 06/08/2017    ALKPHOS 59 06/08/2017    BILITOT 0.8 06/08/2017       No results found for: PSA, PSADIAG, PSATOTAL, PSAFREE, PSAFREEPCT    IMPRESSION:    Encounter Diagnoses   Name Primary?    Renal mass Yes     62 YOM with gross hematuria and 9.8 cm left lower pole renal mass, concerning for [id number] renal cell carcinoma    PLAN:  - CT urogram, CT chest today--reviewed and reveal heterogenous renal mass, no pulmonary nodules or lesions.  No evidence of metastatic disease.   - urine cytology, PSA today   - will schedule flexible cystoscopy   - LFTs WNL   - Discussed with patient that this renal mass likely represents a renal cell carcinoma and will likely require total nephrectomy; this can be done here or closer to patient's home in [place], [place].  Patient after thinking about this would like to have his care here in [place].  Will plan for a left robotic nephrectomy on 6/30.  Patient will come back for a cystoscopy in the office on 6/29/17 in [place] and pre-op/H&P visit on the same day.  - will observe LUTS as they do not bother him  - follow up for cystoscopy and surgery in ~3 weeks.   I spent 60 minutes with the patient of which more than half was spent in direct consultation with the patient in regards to our treatment and plan.

## 2017-06-30 NOTE — OP NOTE
Certification of Assistant at Surgery       Surgery Date: 6/30/2017     Participating Surgeons:  Surgeon(s) and Role:     * Misha Browne MD - Resident - Assisting     * Tulio Garcia MD - Primary    Procedures:  Procedure(s) (LRB):  ROBOTIC ASSISTED LAPAROSCOPIC NEPHRECTOMY (Left)    Assistant Surgeon's Certification of Necessity:  I understand that section 1842 (b) (6) (d) of the Social Security Act generally prohibits Medicare Part B reasonable charge payment for the services of assistants at surgery in teaching hospitals when qualified residents are available to furnish such services. I certify that the services for which payment is claimed were medically necessary, and that no qualified resident was available to perform the services. I further understand that these services are subject to post-payment review by the Medicare carrier.      Brenda Sanchez PA-C    06/30/2017  1:19 PM

## 2017-06-30 NOTE — PLAN OF CARE
Problem: Patient Care Overview  Goal: Plan of Care Review  Outcome: Ongoing (interventions implemented as appropriate)  Plan of care reviewed with pt and spouse; both verbalize understanding. Pt AAOx4. VSS. No distress noted. O2 sats remain 94-97% on 2L nasal cannula. Surgical sites stable; abdomen soft to palpation. Mejía catheter intact draining clear, yellow urine. See epic flowsheet for full assessment and vital signs.

## 2017-06-30 NOTE — OP NOTE
[facility]  Operative Note    Date: 06/30/2017    Pre-Op Diagnosis:   1. Left renal mass suspicious for renal cell carcinoma  Patient Active Problem List   Diagnosis    Hypertension    High cholesterol    Sleep apnea    Non morbid obesity    Hyperglycemia    Lower urinary tract symptoms (LUTS)    Renal mass, left         Post-Op Diagnosis: same    Procedure(s) Performed:   1.  Robotic-assisted laparoscopic left total nephrectomy    Specimen(s):   1.  Left kidney    Surgeon: Tulio Garcia MD    Assistant: Misha Browne MD      Bedside assistant: Brenda Sanchez PA-C (No qualified resident was available for bedside assisting.)      Anesthesia: General endotracheal anesthesia    Indications: Cristhian Mcintosh is a 62 y.o. male with a large left renal mass suspicious for renal cell carcinoma.  After discussion of management options and risks and benefits associated with each the patient has elected to pursue surgical management.      Findings:   - Single left renal artery and single left renal vein were identified and stapled separately  - Left nephrectomy was performed without complication    EBL: 10 mL    Drains:   1.  16 Fr clayton catheter    Anesthesia: General endotracheal anesthesia    Complications:  none    Procedure in Detail: After discussion of risks and benefits of the procedure, informed consent was obtained.  The patient was brought to the operating room and placed supine on the operating table.  SCDs were applied and working prior to induction of anesthesia.  General anesthesia was administered.  A 16 Fr Clayton catheter was placed in the standard fashion with 10 ml sterile water used to inflate the balloon.  An OG tube was placed.  The patient was then moved into the modified flank position with the left side up. The patient was appropriately padded and secured to the table.  He was then prepped and draped in the usual sterile fashion.  Timeout was performed and preoperative  antibiotics were confirmed.      A Veress needle was introduced into the abdomen.  Entry into the peritoneal cavity was confirmed with the drop test and an initial insufflation pressure of <10mmHg.  Aspiration during our drop test revealed no blood or succus.  The peritoneal cavity was insufflated up to 15 mmHg and the Veress was removed.  The location of the 12 mm camera port was marked with a marking pen and incised sharply using a 15 blade.  The 12 mm port was then introduced.  The camera was passed through the port and the abdomen was inspected and found to be free of bowel or vascular injury.  Using direct vision the other 2 robotic ports were placed, just below the left costal margin and lower on the left abdomen, ensuring at least 8 cm between ports to allow robotic mobility.  A 12 mm assistant port was placed in the upper midline and a 12 mm assistant port was placed in the lower midline.  Each trocar was introduced under direct vision ensuring no injury to the underlying abdominal contents.      Dissection began along the white line of Toldt, reflecting the colon medially away from the kidney. The splenic reflection was released.  The psoas muscle was identified. Once the colon was reflected, dissection was carried out just below the kidney, and the ureter and gonadal vein were identified.  The ureter was elevated and we continued our dissection toward the lower pole of the kidney proximally until we identified the renal hilum. The gonadal vein was able to be followed to its entrance into the renal vein.     Careful dissection of the hilum was performed.  The renal vein was identified anteriorly and superiorly.  The renal artery was identified inferior to the renal vein. The artery and vein were isolated circumferentially.  Once the artery and vein were completely isolated, a stapling device was advanced through the assistant port with a vascular load. The left renal artery was stapled first, and then the  left renal vein was stapled separately. The kidney was seen to decompress and lose its pink color. We confirmed there were no additional vessels seen and there was good hemostasis.    After the hilum was stapled, two weck clips were placed on the ureter and the ureter was ligated. The kidney was then freed completely by dissected the lateral, posterior, and upper pole attachments using electrocautery and blunt dissection. The adrenal gland was spared. Once the kidney was completely freed, an endocatch bag was advanced through the assistant port and the left kidney was placed in the bag.    The robot was undocked and all trocars were removed.  The 12 mm periumbilical incision was extended as a low midline incision and the fascial incision was extended with the skin. The kidney was extracted in the bag.    The extraction site fascia was closed using 0 looped PDS.  The midline incision was reapproximated using 3-0 vicryl in interrupted deep dermal fashion. All skin incisions were closed using 4-0 monocryl.  The abdomen was cleaned and dried. Dermabond was applied to all skin incisions.    The patient tolerated the procedure well and was transferred to the recovery room in stable condition.    Disposition: The patient will remain on the urology service overnight for observation.     Misha Browne MD

## 2017-06-30 NOTE — INTERVAL H&P NOTE
The patient has been examined and the H&P has been reviewed:    I concur with the findings and no changes have occurred since H&P was written.    Anesthesia/Surgery risks, benefits and alternative options discussed and understood by patient/family.          Active Hospital Problems    Diagnosis  POA    Renal mass, left [N28.89]  Yes      Resolved Hospital Problems    Diagnosis Date Resolved POA   No resolved problems to display.

## 2017-06-30 NOTE — ANESTHESIA RELEASE NOTE
"Anesthesia Release from PACU Note    Patient: Cristhian Mcintosh    Procedure(s) Performed: Procedure(s) (LRB):  ROBOTIC ASSISTED LAPAROSCOPIC NEPHRECTOMY (Left)    Anesthesia type: general    Post pain: Adequate analgesia    Post assessment: no apparent anesthetic complications    Last Vitals:   Visit Vitals  /81   Pulse 69   Temp 36.4 °C (97.5 °F) (Oral)   Resp 12   Ht 5' 10" (1.778 m)   Wt 105.7 kg (233 lb)   SpO2 95%   BMI 33.43 kg/m²       Post vital signs: stable    Level of consciousness: awake, alert  and oriented    Nausea/Vomiting: no nausea/no vomiting    Complications: none    Airway Patency: patent    Respiratory: unassisted    Cardiovascular: stable and blood pressure at baseline    Hydration: euvolemic  "

## 2017-06-30 NOTE — ANESTHESIA POSTPROCEDURE EVALUATION
"Anesthesia Post Evaluation    Patient: Cristhian Mcintosh    Procedure(s) Performed: Procedure(s) (LRB):  ROBOTIC ASSISTED LAPAROSCOPIC NEPHRECTOMY (Left)    Final Anesthesia Type: general  Patient location during evaluation: PACU  Patient participation: Yes- Able to Participate  Level of consciousness: awake and alert and oriented  Post-procedure vital signs: reviewed and stable  Pain management: adequate  Airway patency: patent  PONV status at discharge: No PONV  Anesthetic complications: no      Cardiovascular status: blood pressure returned to baseline  Respiratory status: unassisted  Hydration status: euvolemic  Follow-up not needed.        Visit Vitals  /81   Pulse 69   Temp 36.4 °C (97.5 °F) (Oral)   Resp 12   Ht 5' 10" (1.778 m)   Wt 105.7 kg (233 lb)   SpO2 95%   BMI 33.43 kg/m²       Pain/Alexander Score: Pain Assessment Performed: Yes (6/30/2017  2:30 PM)  Presence of Pain: complains of pain/discomfort (6/30/2017  2:30 PM)  Pain Rating Prior to Med Admin: 8 (6/30/2017  3:00 PM)  Pain Rating Post Med Admin: 9 (6/30/2017  2:30 PM)  Alexander Score: 8 (6/30/2017  2:30 PM)      "

## 2017-07-01 VITALS
RESPIRATION RATE: 16 BRPM | SYSTOLIC BLOOD PRESSURE: 140 MMHG | TEMPERATURE: 97 F | WEIGHT: 233 LBS | OXYGEN SATURATION: 95 % | DIASTOLIC BLOOD PRESSURE: 79 MMHG | HEIGHT: 70 IN | HEART RATE: 70 BPM | BODY MASS INDEX: 33.36 KG/M2

## 2017-07-01 LAB
ANION GAP SERPL CALC-SCNC: 12 MMOL/L
BASOPHILS # BLD AUTO: 0 K/UL
BASOPHILS NFR BLD: 0 %
BUN SERPL-MCNC: 16 MG/DL
CALCIUM SERPL-MCNC: 8.4 MG/DL
CHLORIDE SERPL-SCNC: 103 MMOL/L
CO2 SERPL-SCNC: 24 MMOL/L
CREAT SERPL-MCNC: 1.4 MG/DL
DIFFERENTIAL METHOD: ABNORMAL
EOSINOPHIL # BLD AUTO: 0 K/UL
EOSINOPHIL NFR BLD: 0.1 %
ERYTHROCYTE [DISTWIDTH] IN BLOOD BY AUTOMATED COUNT: 12.9 %
EST. GFR  (AFRICAN AMERICAN): >60 ML/MIN/1.73 M^2
EST. GFR  (NON AFRICAN AMERICAN): 54.2 ML/MIN/1.73 M^2
GLUCOSE SERPL-MCNC: 107 MG/DL
HCT VFR BLD AUTO: 39.3 %
HGB BLD-MCNC: 13.2 G/DL
LYMPHOCYTES # BLD AUTO: 1 K/UL
LYMPHOCYTES NFR BLD: 6.9 %
MCH RBC QN AUTO: 29.4 PG
MCHC RBC AUTO-ENTMCNC: 33.6 %
MCV RBC AUTO: 88 FL
MONOCYTES # BLD AUTO: 1.1 K/UL
MONOCYTES NFR BLD: 7.5 %
NEUTROPHILS # BLD AUTO: 12.2 K/UL
NEUTROPHILS NFR BLD: 85.2 %
PLATELET # BLD AUTO: 295 K/UL
PMV BLD AUTO: 10 FL
POTASSIUM SERPL-SCNC: 3.8 MMOL/L
RBC # BLD AUTO: 4.49 M/UL
SODIUM SERPL-SCNC: 139 MMOL/L
WBC # BLD AUTO: 14.26 K/UL

## 2017-07-01 PROCEDURE — 63600175 PHARM REV CODE 636 W HCPCS: Performed by: UROLOGY

## 2017-07-01 PROCEDURE — 36415 COLL VENOUS BLD VENIPUNCTURE: CPT

## 2017-07-01 PROCEDURE — 25000003 PHARM REV CODE 250: Performed by: UROLOGY

## 2017-07-01 PROCEDURE — 80048 BASIC METABOLIC PNL TOTAL CA: CPT

## 2017-07-01 PROCEDURE — 85025 COMPLETE CBC W/AUTO DIFF WBC: CPT

## 2017-07-01 RX ORDER — OXYCODONE AND ACETAMINOPHEN 5; 325 MG/1; MG/1
1 TABLET ORAL EVERY 4 HOURS PRN
Qty: 41 TABLET | Refills: 0 | Status: SHIPPED | OUTPATIENT
Start: 2017-07-01

## 2017-07-01 RX ORDER — POLYETHYLENE GLYCOL 3350 17 G/17G
17 POWDER, FOR SOLUTION ORAL DAILY
Qty: 30 PACKET | Refills: 0 | Status: SHIPPED | OUTPATIENT
Start: 2017-07-01

## 2017-07-01 RX ADMIN — OXYCODONE HYDROCHLORIDE 5 MG: 5 TABLET ORAL at 10:07

## 2017-07-01 RX ADMIN — FAMOTIDINE 20 MG: 20 TABLET, FILM COATED ORAL at 08:07

## 2017-07-01 RX ADMIN — HYDROCHLOROTHIAZIDE 25 MG: 25 TABLET ORAL at 06:07

## 2017-07-01 RX ADMIN — ACETAMINOPHEN 1000 MG: 10 INJECTION, SOLUTION INTRAVENOUS at 08:07

## 2017-07-01 RX ADMIN — LORAZEPAM 1 MG: 0.5 TABLET ORAL at 06:07

## 2017-07-01 RX ADMIN — ASPIRIN 81 MG: 81 TABLET, COATED ORAL at 08:07

## 2017-07-01 RX ADMIN — OXYCODONE HYDROCHLORIDE 10 MG: 5 TABLET ORAL at 06:07

## 2017-07-01 RX ADMIN — ACETAMINOPHEN 1000 MG: 10 INJECTION, SOLUTION INTRAVENOUS at 01:07

## 2017-07-01 NOTE — SUBJECTIVE & OBJECTIVE
Interval History:   No acute events overnight  Tolerating PO intake  No nausea  Ambulating well  Complaining of shoulder pain, otherwise pain is controlled    Review of Systems  Objective:     Temp:  [96.2 °F (35.7 °C)-98.9 °F (37.2 °C)] 97.5 °F (36.4 °C)  Pulse:  [65-98] 70  Resp:  [10-19] 14  SpO2:  [91 %-97 %] 96 %  BP: ()/(61-99) 115/75  Arterial Line BP: ()/(45-60) 118/60     Body mass index is 33.43 kg/m².            Drains          No matching active lines, drains, or airways          Physical Exam   Constitutional: No distress.   HENT:   Head: Normocephalic and atraumatic.   Eyes: Conjunctivae are normal. No scleral icterus.   Neck: Normal range of motion.   Cardiovascular: Normal rate.    Pulmonary/Chest: Effort normal. No respiratory distress.   Abdominal: Soft. He exhibits no distension. There is tenderness (appropriate). There is no rebound and no guarding.   Incisions c/d/i  clayton draining clear yellow   Musculoskeletal: Normal range of motion.   SCDs in place   Neurological: He is alert.   Skin: Skin is warm and dry. He is not diaphoretic.     Psychiatric: He has a normal mood and affect.       Significant Labs:    BMP:    Recent Labs  Lab 06/29/17  1040 06/30/17  1333 07/01/17  0403    140 139   K 3.5 3.9 3.8    106 103   CO2 28 23 24   BUN 11 12 16   CREATININE 1.1 1.1 1.4   CALCIUM 9.6 7.6* 8.4*       CBC:     Recent Labs  Lab 06/30/17  1102 06/30/17  1333 07/01/17  0403   WBC  --  12.05 14.26*   HGB  --  12.9* 13.2*   HCT 32* 37.7* 39.3*   PLT  --  266 295       All pertinent labs results from the past 24 hours have been reviewed.    Significant Imaging:  All pertinent imaging results/findings from the past 24 hours have been reviewed.

## 2017-07-01 NOTE — PLAN OF CARE
Problem: Patient Care Overview  Goal: Plan of Care Review  Outcome: Ongoing (interventions implemented as appropriate)  No acute events throughout night, VS and assessment per flow sheet, patient progressing towards goals as tolerated, plan of care reviewed with Cristhian Mcintosh, all concerns addressed, will continue to monitor.

## 2017-07-01 NOTE — ASSESSMENT & PLAN NOTE
- IV tylenol, PO oxycodone for pain control  - regular diet  - dc MIVF  - CBC, BMP daily  - Ambulate pm of surgery and qid  - Drains: Mejía removed  - Prophylaxis: IS, SCDs, GI ppx    DC home following voiding trial. Patient undecided if he will continue to follow up with Dr. Garcia post op or if he will find a urologist in [place]. He was instructed that at minimum he must see someone within 6 months for repeat CT scan.

## 2017-07-01 NOTE — DISCHARGE INSTRUCTIONS
What to expect with your Nephrectomy.  [facility]  Updated 06/10/2011   After surgery  o You may or may not have a drain that is shaped like a grenade and put to suction  - This drain usually may or may not come out on Post op day 1. If you go home with a drain, the nurses will teach you how to record the output and you will come back 3-5 days after you leave to have the drain removed in clinic.  o You will have a catheter after your surgery. It should come out the next day and you should be able to void on your own before you leave. If you are a male and on a BPH medicine, we will need to make sure you restart that the night of your surgery.  o The night of surgery we expect and hope that you will:  - Walk - walking helps get the bowels moving. Also after your surgery, you are at a risk for a deep venous thrombosis (which is a clot in the legs that can form by remaining inactive or still for extended periods of time) and this can travel to your lungs and make you feel short of breath. This is a very serious condition. Walking helps prevent a DVT from occurring.  - Eat - you do not have to eat a whole meal, but we want to make sure you can tolerate liquid and/or solid food without nausea and vomiting  - Use your incentive spirometer - this is the breathing apparatus that helps you expand your lungs. If and when you have pain you will not want to take deep breaths. But if you dont take deep breaths, you are at risk for pneumonia. The incentive spirometer will help prevent this from occurring by expanding your lungs.  o Symptoms you may experience immediately post-op:  - Bloating and/or shoulder pain if you had a laparoscopic procedure - when we do this operation, we fill up your abdominal cavity with gas to better help us visualize the organs and allow our instruments to fit. After the surgery, not all the air can be removed and your body will eventually absorb this small amount of air. However this can make  you feel bloated. In addition, when you sit up, the air can sit right under a muscle (the diaphragm) which has connecting nerves to the shoulders, which could explain why you have shoulder pain.  - Do not expect necessarily to have gas or to have a bowel movement - this goes along with the bloating, you may feel like you want to pass gas or have a bowel movement but you cant. This is normal and you will feel like this for a couple days. There are no pills to help with this. Small walks throughout the day should help with this.  - Pain - your pain should be able to be controlled with medicines by mouth that we prescribe. It is important for you to tell us if you are on any pain medications at home before the surgery as you may need stronger pain meds while in the hospital.   You can go home when:  o Pain is controlled with medicines by mouth  o You are able to walk without difficulty or pain  o You are tolerating a regulating diet  o Your are voiding. If you cannot void we may have to replace a catheter and you will follow up 3-5 days after you leave to have a voiding trial. The nurses will teach you how to care for your catheter.   When you go home:  o Activity  - Continue to walk - small walks throughout the day are better than one long walk.   - Do not lift anything greater than 8 pounds for 6 weeks - we want your abdominal wall muscles to heal.  o Bowel Movements - Do not strain to have a bowel movement - the pain medicines will make you constipated. That is why we also ask you to take colace 2-3 x per day to help keep your bowels regular. If you are still having trouble, then you can also add Miralax once a day. Do not take any stool softeners if you are having diarrhea.  o Drain - If you have a drain (not your catheter, but a separate drain) then record the output and bring it with you to your next appointment  o Smoking - If you smoke, we encourage you STOP. Smoking interferes with the healing process and  your prolong your healing with continued smoking.  o Driving - Do not drive while you are on pain meds or with your catheter in place.  o Bathing - If you do not have a drain, you can shower 48 hours after your surgery. If you do have a drain, sponge bathe only until the drain is out.  o Dressing - you can remove the dressings if there is no drainage or change them as needed if there is. The little sterile band-aid strips will fall off on their own in 10-14 days. If they have not fallen off then you can remove them yourself.  o Restarting medicines -especially blood thinners (Aspirin, Plavix, Coumadin), Fish Oil. Discuss this with your physician prior to discharge.   When to return to the ER  o Fever - If you have a fever >101.5, this could be due to a number of reasons such as infection of the urine or incision. If your catheter has been removed, you could possibly have a leak. It would be best to come to the ER so they can better evaluate you.  o Severe pain - pain is expected, but severe or new onset of pain is not normal.   o Inability to tolerate food or liquid with nausea and vomiting - it would be best to go to the ER for them to better evaluate you.   -

## 2017-07-01 NOTE — PROGRESS NOTES
[facility]  Urology  Progress Note    Patient Name: Cristhian Mcintosh  MRN: [id number]  Admission Date: 6/30/2017  Hospital Length of Stay: 1 days  Code Status: No Order   Attending Provider: Tulio Garcia MD   Primary Care Physician: Primary Doctor No    Subjective:     HPI:  Cristhian Mcintosh is a 62 y.o. male POD 1 s/p robotic left nephrectomy     Interval History:   No acute events overnight  Tolerating PO intake  No nausea  Ambulating well  Complaining of shoulder pain, otherwise pain is controlled    Review of Systems  Objective:     Temp:  [96.2 °F (35.7 °C)-98.9 °F (37.2 °C)] 97.5 °F (36.4 °C)  Pulse:  [65-98] 70  Resp:  [10-19] 14  SpO2:  [91 %-97 %] 96 %  BP: ()/(61-99) 115/75  Arterial Line BP: ()/(45-60) 118/60     Body mass index is 33.43 kg/m².            Drains          No matching active lines, drains, or airways          Physical Exam   Constitutional: No distress.   HENT:   Head: Normocephalic and atraumatic.   Eyes: Conjunctivae are normal. No scleral icterus.   Neck: Normal range of motion.   Cardiovascular: Normal rate.    Pulmonary/Chest: Effort normal. No respiratory distress.   Abdominal: Soft. He exhibits no distension. There is tenderness (appropriate). There is no rebound and no guarding.   Incisions c/d/i  clayton draining clear yellow   Musculoskeletal: Normal range of motion.   SCDs in place   Neurological: He is alert.   Skin: Skin is warm and dry. He is not diaphoretic.     Psychiatric: He has a normal mood and affect.       Significant Labs:    BMP:    Recent Labs  Lab 06/29/17  1040 06/30/17  1333 07/01/17  0403    140 139   K 3.5 3.9 3.8    106 103   CO2 28 23 24   BUN 11 12 16   CREATININE 1.1 1.1 1.4   CALCIUM 9.6 7.6* 8.4*       CBC:     Recent Labs  Lab 06/30/17  1102 06/30/17  1333 07/01/17  0403   WBC  --  12.05 14.26*   HGB  --  12.9* 13.2*   HCT 32* 37.7* 39.3*   PLT  --  266 295       All pertinent labs results from the past 24  hours have been reviewed.    Significant Imaging:  All pertinent imaging results/findings from the past 24 hours have been reviewed.      Assessment/Plan:     * Renal mass, left    - IV tylenol, PO oxycodone for pain control  - regular diet  - dc MIVF  - CBC, BMP daily  - Ambulate pm of surgery and qid  - Drains: Mejía removed  - Prophylaxis: IS, SCDs, GI ppx    DC home following voiding trial. Patient undecided if he will continue to follow up with Dr. Garcia post op or if he will find a urologist in [place]. He was instructed that at minimum he must see someone within 6 months for repeat CT scan.               VTE Risk Mitigation         Ordered     Medium Risk of VTE  Once      06/30/17 1333     Place sequential compression device  Until discontinued      06/30/17 1333          Jacqueline Tucker MD  Urology  [facility]

## 2017-07-01 NOTE — PROGRESS NOTES
Pt discharged to home via wheelchair. Pt denies pain at the present time. Condition stable. Follows commands. Pt given prescriptions and copy of discharge home care instructions. Dr. Tucker notified; adequate for discharge. Incisions clean dry and intact. Pt verbalized understanding of activity limitations and s/s of when to call the Dr. Pt also given information on followup appointment. All questions answered. All belongings with the patient. Pt verbalized understanding of all discharge instructions.

## 2017-07-01 NOTE — DISCHARGE SUMMARY
[facility]  Urology  Discharge Summary      Patient Name: Cristhian Mcintosh  MRN: [id number]  Admission Date: 6/30/2017  Hospital Length of Stay: 1 days  Discharge Date and Time:  07/01/2017 4:03 PM  Attending Physician: Tulio Garcia MD   Discharging Provider: Jacqueline Tucker MD  Primary Care Physician: Primary Doctor No    HPI:   Cristhian Mcintosh is a 62 y.o. male with HTN, HLD who presented to the ED 6/8/2017 with gross hematuria x 24 hours.  No prior history of hematuria.  No history of trauma.  CT RSS was performed, which demonstrated a 9.8 cm left lower pole renal mass.  The patient is a former smoker, 15 pack years.  Denies fever, chills, N/V, dysuria, flank pain.       Patient complains of hesitancy, slightly decreased FOS, nocturia x 2.  Denies intermittency, frequency, urgency. He is not bothered by his LUTS.  Patient denies ED.     Procedure(s) (LRB):  ROBOTIC ASSISTED LAPAROSCOPIC NEPHRECTOMY (Left)     Indwelling Lines/Drains at time of discharge:   Lines/Drains/Airways          No matching active lines, drains, or airways          Hospital Course (synopsis of major diagnoses, care, treatment, and services provided during the course of the hospital stay): Patient admitted following left radical nephrectomy. He tolerated the procedure well with no complications. On POD 1 his pain was well controlled, he was ambulating well and tolerating a regular diet. His clayton was removed and he was discharged home following voiding trial.     Consults:     Significant Diagnostic Studies: see chart review    Pending Diagnostic Studies:     None          Final Active Diagnoses:    Diagnosis Date Noted POA    PRINCIPAL PROBLEM:  Renal mass, left [N28.89] 06/30/2017 Yes    High cholesterol [E78.00] 06/28/2017 Yes    Hyperglycemia [R73.9] 06/28/2017 Yes    Hypertension [I10] 06/28/2017 Yes    Non morbid obesity [E66.9] 06/28/2017 Yes    Sleep apnea [G47.30] 06/28/2017 Yes      Problems  Resolved During this Admission:    Diagnosis Date Noted Date Resolved POA       Discharged Condition: good    Disposition: Home or Self Care    Follow Up:    Patient Instructions:     Diet general     Call MD for:  severe uncontrolled pain     Call MD for:  temperature >100.4     Call MD for:  persistent nausea and vomiting or diarrhea     Call MD for:  redness, tenderness, or signs of infection (pain, swelling, redness, odor or green/yellow discharge around incision site)     Call MD for:  difficulty breathing or increased cough     Call MD for:  severe persistent headache     Call MD for:  worsening rash     Call MD for:  persistent dizziness, light-headedness, or visual disturbances     Call MD for:  increased confusion or weakness     No dressing needed       Medications:  Reconciled Home Medications:   Current Discharge Medication List      START taking these medications    Details   oxycodone-acetaminophen (PERCOCET) 5-325 mg per tablet Take 1 tablet by mouth every 4 (four) hours as needed for Pain.  Qty: 41 tablet, Refills: 0      polyethylene glycol (GLYCOLAX) 17 gram PwPk Take 17 g by mouth once daily.  Qty: 30 packet, Refills: 0         CONTINUE these medications which have NOT CHANGED    Details   hydrochlorothiazide (HYDRODIURIL) 25 MG tablet Take 25 mg by mouth every morning.       lorazepam (ATIVAN) 1 MG tablet TAKE ONE TABLET BY MOUTH THREE TIMES DAILY AS NEEDED FOR ANXIETY.      pravastatin (PRAVACHOL) 40 MG tablet Take 40 mg by mouth every evening.       zolpidem (AMBIEN CR) 12.5 MG CR tablet TAKE 1 TABLET BY MOUTH EVERY DAY AT BEDTIME AS NEEDED FOR INSOMNIA      aspirin (ECOTRIN) 81 MG EC tablet Take 81 mg by mouth once daily.              Time spent on the discharge of patient: 20 minutes    Jacqueline Tucker MD  Urology  [facility]

## 2017-11-29 ENCOUNTER — OFFICE VISIT (OUTPATIENT)
Dept: URGENT CARE | Facility: CLINIC | Age: 62
End: 2017-11-29
Payer: COMMERCIAL

## 2017-11-29 VITALS
DIASTOLIC BLOOD PRESSURE: 100 MMHG | HEIGHT: 70 IN | OXYGEN SATURATION: 97 % | HEART RATE: 78 BPM | WEIGHT: 233 LBS | BODY MASS INDEX: 33.36 KG/M2 | TEMPERATURE: 99 F | RESPIRATION RATE: 16 BRPM | SYSTOLIC BLOOD PRESSURE: 145 MMHG

## 2017-11-29 DIAGNOSIS — R09.81 SINUS CONGESTION: Primary | ICD-10-CM

## 2017-11-29 DIAGNOSIS — R68.89 FLU-LIKE SYMPTOMS: ICD-10-CM

## 2017-11-29 LAB
CTP QC/QA: YES
FLUAV AG NPH QL: NEGATIVE
FLUBV AG NPH QL: NEGATIVE

## 2017-11-29 PROCEDURE — 99214 OFFICE O/P EST MOD 30 MIN: CPT | Mod: S$GLB,,, | Performed by: FAMILY MEDICINE

## 2017-11-29 PROCEDURE — 87804 INFLUENZA ASSAY W/OPTIC: CPT | Mod: QW,S$GLB,, | Performed by: FAMILY MEDICINE

## 2017-11-29 RX ORDER — METHYLPREDNISOLONE 4 MG/1
4 TABLET ORAL DAILY
Qty: 1 PACKAGE | Refills: 0 | Status: SHIPPED | OUTPATIENT
Start: 2017-11-29 | End: 2018-11-29

## 2017-11-29 RX ORDER — OSELTAMIVIR PHOSPHATE 75 MG/1
75 CAPSULE ORAL 2 TIMES DAILY
Qty: 10 CAPSULE | Refills: 0 | Status: SHIPPED | OUTPATIENT
Start: 2017-11-29 | End: 2017-12-09

## 2017-11-29 RX ORDER — DOXYCYCLINE 100 MG/1
100 CAPSULE ORAL 2 TIMES DAILY
Qty: 20 CAPSULE | Refills: 0 | Status: SHIPPED | OUTPATIENT
Start: 2017-11-29 | End: 2017-12-09

## 2017-11-29 RX ORDER — ZOLPIDEM TARTRATE 12.5 MG/1
TABLET, FILM COATED, EXTENDED RELEASE ORAL
COMMUNITY
Start: 2017-08-28

## 2017-11-29 NOTE — PROGRESS NOTES
"Subjective:       Patient ID: Cristhian Mcintosh is a 62 y.o. male.    Vitals:  height is 5' 10" (1.778 m) and weight is 105.7 kg (233 lb). His temperature is 98.9 °F (37.2 °C). His blood pressure is 145/100 (abnormal) and his pulse is 78. His respiration is 16 and oxygen saturation is 97%.     Chief Complaint: Sinus Problem    Had all sinusitis 2 weeks ago, improved , then worsened a couple days ago.       Sinus Problem   This is a new problem. The current episode started in the past 7 days. The problem is unchanged. There has been no fever. Associated symptoms include congestion, coughing, ear pain, sinus pressure and a sore throat. Pertinent negatives include no chills, headaches, hoarse voice or shortness of breath. Past treatments include oral decongestants. The treatment provided mild relief.     Review of Systems   Constitution: Positive for malaise/fatigue. Negative for chills and fever.   HENT: Positive for congestion, ear pain, sinus pressure and sore throat. Negative for hoarse voice.    Eyes: Negative for discharge and redness.   Cardiovascular: Negative for chest pain, dyspnea on exertion and leg swelling.   Respiratory: Positive for cough and sputum production. Negative for shortness of breath and wheezing.    Musculoskeletal: Negative for myalgias.   Gastrointestinal: Negative for abdominal pain and nausea.   Neurological: Negative for headaches.       Objective:      Physical Exam   Constitutional: He is oriented to person, place, and time. He appears well-developed and well-nourished. He is cooperative.  Non-toxic appearance. He does not appear ill. No distress.   HENT:   Head: Normocephalic and atraumatic.   Right Ear: Hearing, external ear and ear canal normal. A middle ear effusion is present.   Left Ear: Hearing, external ear and ear canal normal. A middle ear effusion is present.   Nose: Mucosal edema present. No rhinorrhea or nasal deformity. No epistaxis. Right sinus exhibits no maxillary sinus " tenderness and no frontal sinus tenderness. Left sinus exhibits no maxillary sinus tenderness and no frontal sinus tenderness.   Mouth/Throat: Uvula is midline, oropharynx is clear and moist and mucous membranes are normal. No trismus in the jaw. Normal dentition. No uvula swelling. No posterior oropharyngeal erythema.   Eyes: Conjunctivae and lids are normal. No scleral icterus.   Sclera clear bilat   Neck: Trachea normal, full passive range of motion without pain and phonation normal. Neck supple.   Cardiovascular: Normal rate, regular rhythm, normal heart sounds, intact distal pulses and normal pulses.    Pulmonary/Chest: Effort normal and breath sounds normal. No respiratory distress.   Abdominal: Normal appearance. He exhibits no distension. There is no tenderness.   Musculoskeletal: Normal range of motion. He exhibits no edema or deformity.   Neurological: He is alert and oriented to person, place, and time. He exhibits normal muscle tone. Coordination normal.   Skin: Skin is warm, dry and intact. He is not diaphoretic. No pallor.   Psychiatric: He has a normal mood and affect. His speech is normal and behavior is normal. Judgment and thought content normal. Cognition and memory are normal.   Nursing note and vitals reviewed.      Assessment:       1. Sinus congestion    2. Flu-like symptoms        Plan:         Sinus congestion  -     POCT Influenza A/B    Flu-like symptoms    Other orders  -     doxycycline (VIBRAMYCIN) 100 MG Cap; Take 1 capsule (100 mg total) by mouth 2 (two) times daily.  Dispense: 20 capsule; Refill: 0  -     methylPREDNISolone (MEDROL DOSEPACK) 4 mg tablet; Take 1 tablet (4 mg total) by mouth once daily. use as directed  Dispense: 1 Package; Refill: 0  -     oseltamivir (TAMIFLU) 75 MG capsule; Take 1 capsule (75 mg total) by mouth 2 (two) times daily.  Dispense: 10 capsule; Refill: 0

## 2017-11-30 ENCOUNTER — TELEPHONE (OUTPATIENT)
Dept: URGENT CARE | Facility: CLINIC | Age: 62
End: 2017-11-30

## 2017-12-02 ENCOUNTER — TELEPHONE (OUTPATIENT)
Dept: URGENT CARE | Facility: CLINIC | Age: 62
End: 2017-12-02

## 2018-05-01 ENCOUNTER — TELEPHONE (OUTPATIENT)
Dept: UROLOGY | Facility: CLINIC | Age: 63
End: 2018-05-01

## 2018-05-01 DIAGNOSIS — C64.2 RENAL CELL CARCINOMA OF LEFT KIDNEY: Primary | ICD-10-CM

## 2018-05-01 NOTE — TELEPHONE ENCOUNTER
Spoke with patient's wife.  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      Would like to come meet with Dr. Bucio to discuss adjuvant/salvage systemic therapy vs. Clinical trial options.  Please arrange this f/u.  Referral order placed.  They will bring imaging disc.  Patient comes from [place], [place].  [place].

## 2018-05-01 NOTE — TELEPHONE ENCOUNTER
----- Message from Brian Wayne LPN sent at 5/1/2018  9:42 AM CDT -----  Contact: wife - sarah hendrickson - [phone]      ----- Message -----  From: Jacki Shields  Sent: 5/1/2018   8:11 AM  To: Jose Antunez Staff    Needs Medical Advice    Who Called: wife  Symptoms (please be specific):  Calling about chemo and spots that have grown  How long has patient had these symptoms:    Pharmacy name and phone # if needed:    Communication Preference ([place] response to Pt. (or) Call Back # and timeframe):wife - sarah hendrickson - [phone]  Additional Information: is looking for advice on chemo

## (undated) DEVICE — GOWN SURGICAL X-LARGE

## (undated) DEVICE — DRESSING ABSRBNT ISLAND 3.6X8

## (undated) DEVICE — ELECTRODE REM PLYHSV RETURN 9

## (undated) DEVICE — SUT MCRYL PLUS 4-0 PS2 27IN

## (undated) DEVICE — TAPE SURG DURAPORE 1 SGL USE

## (undated) DEVICE — HEMOSTAT SURGICEL 4X8IN

## (undated) DEVICE — CART STAPLE RELD 45MM WHT

## (undated) DEVICE — STERILE WATER 1000ML BOTTLE

## (undated) DEVICE — TROCAR ENDOPATH XCEL 12X100MM

## (undated) DEVICE — SOL ELECTROLUBE ANTI-STIC

## (undated) DEVICE — SUT PROLENE RB-1 4-0

## (undated) DEVICE — COVER LIGHT HANDLE

## (undated) DEVICE — NDL HYPO A BEVEL 22X1 1/2

## (undated) DEVICE — KIT ROBOTIC 4 ARM DA VINCI SI

## (undated) DEVICE — PORT AIRSEAL 12/120MM LPI

## (undated) DEVICE — TRAY CATH UM FOLEY SIL W 16FR

## (undated) DEVICE — SPONGE GAUZE 16PLY 4X4

## (undated) DEVICE — HEMOSTAT SURGICEL NU-KNIT 6X9

## (undated) DEVICE — SYR SLIP TIP 20CC

## (undated) DEVICE — CLIP HEMO-LOK MLX LARGE LF

## (undated) DEVICE — COVER TIP CURVED SCISSORS XI

## (undated) DEVICE — TAPE SILK 3IN

## (undated) DEVICE — IRRIGATOR ENDOSCOPY DISP.

## (undated) DEVICE — SOL NS 1000CC

## (undated) DEVICE — PACK ROBOTIC

## (undated) DEVICE — STAPLER INT LINEAR ARTC 3.5-45

## (undated) DEVICE — CLOSURE SKIN STERI STRIP 1/2X4

## (undated) DEVICE — NDL INSUF ULTRA VERESS 120MM

## (undated) DEVICE — SEE MEDLINE ITEM 146417

## (undated) DEVICE — ADHESIVE DERMABOND ADVANCED

## (undated) DEVICE — SYR 30CC LUER LOCK

## (undated) DEVICE — ENDOCATCH 15MM

## (undated) DEVICE — BLADE SURG #15 CARBON STEEL

## (undated) DEVICE — DRAPE ABDOMINAL TIBURON 14X11

## (undated) DEVICE — SET TRI-LUMEN FILTERED TUBE